# Patient Record
Sex: FEMALE | Race: WHITE | NOT HISPANIC OR LATINO | Employment: OTHER | URBAN - NONMETROPOLITAN AREA
[De-identification: names, ages, dates, MRNs, and addresses within clinical notes are randomized per-mention and may not be internally consistent; named-entity substitution may affect disease eponyms.]

---

## 2017-02-21 ENCOUNTER — OFFICE VISIT (OUTPATIENT)
Dept: OTOLARYNGOLOGY | Facility: CLINIC | Age: 72
End: 2017-02-21

## 2017-02-21 ENCOUNTER — PROCEDURE VISIT (OUTPATIENT)
Dept: OTOLARYNGOLOGY | Facility: CLINIC | Age: 72
End: 2017-02-21

## 2017-02-21 VITALS
BODY MASS INDEX: 28 KG/M2 | HEART RATE: 98 BPM | HEIGHT: 64 IN | WEIGHT: 164 LBS | SYSTOLIC BLOOD PRESSURE: 142 MMHG | DIASTOLIC BLOOD PRESSURE: 86 MMHG | RESPIRATION RATE: 20 BRPM | TEMPERATURE: 98 F

## 2017-02-21 DIAGNOSIS — H90.12 CONDUCTIVE HEARING LOSS IN LEFT EAR: ICD-10-CM

## 2017-02-21 DIAGNOSIS — H90.5 SENSORINEURAL HEARING LOSS OF RIGHT EAR: ICD-10-CM

## 2017-02-21 DIAGNOSIS — J30.9 ALLERGIC RHINITIS, UNSPECIFIED ALLERGIC RHINITIS TRIGGER, UNSPECIFIED RHINITIS SEASONALITY: ICD-10-CM

## 2017-02-21 DIAGNOSIS — H90.12 CONDUCTIVE HEARING LOSS OF LEFT EAR WITH UNRESTRICTED HEARING OF CONTRALATERAL EAR: ICD-10-CM

## 2017-02-21 DIAGNOSIS — H72.92 TYMPANIC MEMBRANE PERFORATION, LEFT: Primary | ICD-10-CM

## 2017-02-21 DIAGNOSIS — H61.21 IMPACTED CERUMEN OF RIGHT EAR: ICD-10-CM

## 2017-02-21 DIAGNOSIS — H69.83 ETD (EUSTACHIAN TUBE DYSFUNCTION), BILATERAL: ICD-10-CM

## 2017-02-21 PROCEDURE — G0268 REMOVAL OF IMPACTED WAX MD: HCPCS | Performed by: NURSE PRACTITIONER

## 2017-02-21 PROCEDURE — 99214 OFFICE O/P EST MOD 30 MIN: CPT | Performed by: NURSE PRACTITIONER

## 2017-02-21 NOTE — PROGRESS NOTES
Procedure   Procedures    PROCEDURE NOTE    LOCATION: [facility]  PROVIDER: DAYANNA Jacobs   PREOPERATIVE DIAGNOSIS: Cerumen Impaction  POSTOPERATIVE DIAGNOSIS: Same  PROCEDURE: Cerumen removal  ANESTHESIA: None   REASON FOR THE OPERATION: The patient verbally consented to intervention after a full discussion of risks, benefits, and alternatives. No guarantees were made or implied.   DETAILS OF THE OPERATION: The patient was reclined in the procedure room chair. The binocular microscope was used to visualize the ear canal and tympanic membrane. Cerumen was then removed from the both ears using a alligator forceps and suction. Findings: Severe cerumen impaction removed from the right EAC.  Right EAC clear without lesion.  Right TM intact without effusion.  Small amount of cerumen was removed from the left EAC revealing persistent, inferior central TM perforation.  Left EAC clear without lesion.  Left TM perforation without evidence of drainage or cholesteatoma.. Patient tolerated procedure well and was without complications

## 2017-02-21 NOTE — PATIENT INSTRUCTIONS
Dry ear precautions. Call for ear drainage, ear pain, fever over 101, or hearing loss. Call for problems or worsening symptoms.

## 2017-02-21 NOTE — PATIENT INSTRUCTIONS
(1) Pt to see the medical provider due to the perforated TM.  (2) Pt to receive audiological evaluations as needed.

## 2017-02-21 NOTE — PROGRESS NOTES
PRIMARY CARE PROVIDER: Baltazar Dockery Jr., MD  REFERRING PROVIDER: No ref. provider found    Chief Complaint   Patient presents with   • Follow-up     Ears       Subjective   History of Present Illness:  Kajal Huston is a  71 y.o.  female who is here for follow up of left ear problems. She has had problems with ear fullness, ear drainage, muffled hearing, TM perforation, and ear infections which began in June 2016. The symptoms were localized to the left ear. The symptoms have been resolved for the last several months.  Today, she has no current complaints.  She denies pain, fever and chills, otalgia, ear fullness, ear pressure, otorrhea, ear infections, fluid on the ear, vertigo, dizziness, imbalance, hearing loss, decreased hearing, change in hearing, muffled hearing and tinnitus, nasal congestion, drainage and sinusitis.     Review of Systems:  Review of Systems   Constitutional: Negative for chills, fatigue and fever.   HENT: Negative for congestion, ear discharge, ear pain, hearing loss, postnasal drip, sore throat, tinnitus and voice change.    All other systems reviewed and are negative.      Past History:  Past Medical History   Diagnosis Date   • Diabetes    • Thyroid disorder      Past Surgical History   Procedure Laterality Date   • Hysterectomy     • Tonsillectomy       History reviewed. No pertinent family history.     Social History   Substance Use Topics   • Smoking status: Never Smoker   • Smokeless tobacco: None   • Alcohol use No       Current Outpatient Prescriptions:   •  carvedilol (COREG) 12.5 MG tablet, , Disp: , Rfl:   •  glipiZIDE (GLUCOTROL) 10 MG 24 hr tablet, , Disp: , Rfl:   •  glipiZIDE (GLUCOTROL) 5 MG ER tablet, , Disp: , Rfl:   •  levothyroxine (SYNTHROID, LEVOTHROID) 50 MCG tablet, , Disp: , Rfl:   •  lisinopril (PRINIVIL,ZESTRIL) 20 MG tablet, , Disp: , Rfl:   •  lovastatin (MEVACOR) 20 MG tablet, , Disp: , Rfl:   •  metFORMIN XR (GLUCOPHAGE-XR) 500 MG 24 hr tablet, , Disp: ,  "Rfl:   •  NOVOLIN 70/30 RELION (70-30) 100 UNIT/ML injection, , Disp: , Rfl:   Allergies:  Review of patient's allergies indicates no known allergies.    Objective     Vital Signs:      Visit Vitals   • /86   • Pulse 98   • Temp 98 °F (36.7 °C)   • Resp 20   • Ht 64\" (162.6 cm)   • Wt 164 lb (74.4 kg)   • BMI 28.15 kg/m2         Physical Exam:  Physical Exam   Constitutional: She is oriented to person, place, and time. She appears well-developed and well-nourished. She is cooperative. No distress.   HENT:   Head: Normocephalic and atraumatic.   Right Ear: External ear and ear canal normal. No drainage or swelling. No middle ear effusion.   Left Ear: External ear and ear canal normal. No drainage or swelling. Tympanic membrane is perforated (no evidence of cholesteatoma).   Ears:    Nose: Mucosal edema present. No rhinorrhea or nasal deformity.   Mouth/Throat: Uvula is midline, oropharynx is clear and moist and mucous membranes are normal.   Bilateral cerumen removed under microscopy for exam.  Severe cerumen impaction removed from the right EAC with suction and forceps.  A small amount of cerumen was removed from the left EAC.   Eyes: Conjunctivae, EOM and lids are normal.   Neck: Phonation normal. Neck supple.   Pulmonary/Chest: Effort normal. No stridor. No respiratory distress.   Lymphadenopathy:     She has no cervical adenopathy.   Neurological: She is alert and oriented to person, place, and time. She has normal strength. No cranial nerve deficit.   Skin: Skin is warm and intact.   Psychiatric: She has a normal mood and affect. Her speech is normal and behavior is normal. Judgment and thought content normal.     Results Reviewed:      Assessment   Assessment:  1. Tympanic membrane perforation, left    2. ETD (eustachian tube dysfunction), bilateral    3. Conductive hearing loss in left ear    4. Sensorineural hearing loss of right ear    5. Allergic rhinitis, unspecified allergic rhinitis trigger, " unspecified rhinitis seasonality    6. Impacted cerumen of right ear        Plan   Plan:  Continue Flonase. Will continue to closely monitor left TM perforation. Right cerumen impaction removed- see procedure note.    Patient Instructions   Dry ear precautions. Call for ear drainage, ear pain, fever over 101, or hearing loss. Call for problems or worsening symptoms.     Return in about 6 months (around 8/21/2017), or if symptoms worsen or fail to improve, for Recheck.    My findings and recommendations were discussed and questions were answered.

## 2017-09-01 ENCOUNTER — OFFICE VISIT (OUTPATIENT)
Dept: OTOLARYNGOLOGY | Facility: CLINIC | Age: 72
End: 2017-09-01

## 2017-09-01 VITALS
BODY MASS INDEX: 28 KG/M2 | HEART RATE: 63 BPM | HEIGHT: 64 IN | WEIGHT: 164 LBS | TEMPERATURE: 98 F | DIASTOLIC BLOOD PRESSURE: 72 MMHG | SYSTOLIC BLOOD PRESSURE: 131 MMHG | RESPIRATION RATE: 20 BRPM

## 2017-09-01 DIAGNOSIS — J30.9 ALLERGIC RHINITIS, UNSPECIFIED ALLERGIC RHINITIS TRIGGER, UNSPECIFIED RHINITIS SEASONALITY: ICD-10-CM

## 2017-09-01 DIAGNOSIS — H72.92 TYMPANIC MEMBRANE PERFORATION, LEFT: Primary | ICD-10-CM

## 2017-09-01 DIAGNOSIS — H69.83 ETD (EUSTACHIAN TUBE DYSFUNCTION), BILATERAL: ICD-10-CM

## 2017-09-01 DIAGNOSIS — H90.12 CONDUCTIVE HEARING LOSS IN LEFT EAR: ICD-10-CM

## 2017-09-01 DIAGNOSIS — H90.5 SENSORINEURAL HEARING LOSS OF RIGHT EAR: ICD-10-CM

## 2017-09-01 PROCEDURE — 99213 OFFICE O/P EST LOW 20 MIN: CPT | Performed by: NURSE PRACTITIONER

## 2017-09-01 NOTE — PROGRESS NOTES
PRIMARY CARE PROVIDER: Baltazar Dockery Jr., MD  REFERRING PROVIDER: No ref. provider found    Chief Complaint   Patient presents with   • Follow-up     EARS       Subjective   History of Present Illness:  Kajal Huston is a  71 y.o.  female who is here for follow up of left ear problems. She has had problems with ear fullness, ear drainage, muffled hearing, TM perforation, and ear infections which began in June 2016. The symptoms were localized to the left ear. The symptoms have been resolved for the last 1 year.  Today, she has no current complaints.  She denies pain, fever and chills, otalgia, ear fullness, ear pressure, otorrhea, ear infections, fluid on the ear, vertigo, dizziness, imbalance, hearing loss, decreased hearing, change in hearing, muffled hearing and tinnitus, nasal congestion, drainage and sinusitis.     Review of Systems:  Review of Systems   Constitutional: Negative for chills, fatigue and fever.   HENT: Negative for congestion, ear discharge, ear pain, hearing loss, postnasal drip, sore throat, tinnitus and voice change.    All other systems reviewed and are negative.      Past History:  Past Medical History:   Diagnosis Date   • Diabetes    • Thyroid disorder      Past Surgical History:   Procedure Laterality Date   • HYSTERECTOMY     • TONSILLECTOMY       History reviewed. No pertinent family history.     Social History   Substance Use Topics   • Smoking status: Never Smoker   • Smokeless tobacco: None   • Alcohol use No       Current Outpatient Prescriptions:   •  carvedilol (COREG) 12.5 MG tablet, , Disp: , Rfl:   •  glipiZIDE (GLUCOTROL) 10 MG 24 hr tablet, , Disp: , Rfl:   •  glipiZIDE (GLUCOTROL) 5 MG ER tablet, , Disp: , Rfl:   •  levothyroxine (SYNTHROID, LEVOTHROID) 50 MCG tablet, , Disp: , Rfl:   •  lisinopril (PRINIVIL,ZESTRIL) 20 MG tablet, , Disp: , Rfl:   •  lovastatin (MEVACOR) 20 MG tablet, , Disp: , Rfl:   •  metFORMIN XR (GLUCOPHAGE-XR) 500 MG 24 hr tablet, , Disp: , Rfl:  "  •  NOVOLIN 70/30 RELION (70-30) 100 UNIT/ML injection, , Disp: , Rfl:   Allergies:  Review of patient's allergies indicates no known allergies.    Objective     Vital Signs:      /72  Pulse 63  Temp 98 °F (36.7 °C)  Resp 20  Ht 64\" (162.6 cm)  Wt 164 lb (74.4 kg)  BMI 28.15 kg/m2      Physical Exam:  Physical Exam   Constitutional: She is oriented to person, place, and time. She appears well-developed and well-nourished. She is cooperative. No distress.   HENT:   Head: Normocephalic and atraumatic.   Right Ear: External ear and ear canal normal. No drainage or swelling. No middle ear effusion.   Left Ear: External ear and ear canal normal. No drainage or swelling. Tympanic membrane is perforated.   Ears:    Nose: Mucosal edema present. No rhinorrhea or nasal deformity.   Mouth/Throat: Uvula is midline, oropharynx is clear and moist and mucous membranes are normal.   Thin ceruminous film covering left TM. Left TM perforation appears to be present but may be healing.  No evidence of cholesteatoma or drainage.   Eyes: Conjunctivae, EOM and lids are normal.   Neck: Phonation normal. Neck supple.   Pulmonary/Chest: Effort normal. No stridor. No respiratory distress.   Lymphadenopathy:     She has no cervical adenopathy.   Neurological: She is alert and oriented to person, place, and time. She has normal strength. No cranial nerve deficit.   Skin: Skin is warm and intact.   Psychiatric: She has a normal mood and affect. Her speech is normal and behavior is normal. Judgment and thought content normal.     Results Reviewed:      Assessment   Assessment:  1. Tympanic membrane perforation, left    2. ETD (eustachian tube dysfunction), bilateral    3. Conductive hearing loss in left ear    4. Sensorineural hearing loss of right ear    5. Allergic rhinitis, unspecified allergic rhinitis trigger, unspecified rhinitis seasonality        Plan   Plan:    Continue Flonase. Will continue to closely monitor left TM " perforation.  Follow-up in 6 months with audiogram. Dry ear precautions. Call for ear drainage, ear pain, fever over 101, or hearing loss. Call for problems or worsening symptoms.     Return in about 6 months (around 3/1/2018) for With Audio.    My findings and recommendations were discussed and questions were answered.

## 2017-09-06 PROBLEM — H72.90 TYMPANIC MEMBRANE PERFORATION: Status: ACTIVE | Noted: 2017-09-06

## 2017-09-06 PROBLEM — H69.90 ETD (EUSTACHIAN TUBE DYSFUNCTION): Status: ACTIVE | Noted: 2017-09-06

## 2017-09-06 PROBLEM — H69.80 ETD (EUSTACHIAN TUBE DYSFUNCTION): Status: ACTIVE | Noted: 2017-09-06

## 2017-09-06 PROBLEM — J30.9 ALLERGIC RHINITIS: Status: ACTIVE | Noted: 2017-09-06

## 2018-03-06 ENCOUNTER — OFFICE VISIT (OUTPATIENT)
Dept: OTOLARYNGOLOGY | Facility: CLINIC | Age: 73
End: 2018-03-06

## 2018-03-06 VITALS
BODY MASS INDEX: 27.66 KG/M2 | DIASTOLIC BLOOD PRESSURE: 78 MMHG | SYSTOLIC BLOOD PRESSURE: 132 MMHG | WEIGHT: 162 LBS | HEIGHT: 64 IN | TEMPERATURE: 98.4 F

## 2018-03-06 DIAGNOSIS — H90.12 CONDUCTIVE HEARING LOSS OF LEFT EAR, UNSPECIFIED HEARING STATUS ON CONTRALATERAL SIDE: ICD-10-CM

## 2018-03-06 DIAGNOSIS — H90.5 SENSORINEURAL HEARING LOSS (SNHL) OF RIGHT EAR, UNSPECIFIED HEARING STATUS ON CONTRALATERAL SIDE: ICD-10-CM

## 2018-03-06 DIAGNOSIS — H69.83 ETD (EUSTACHIAN TUBE DYSFUNCTION), BILATERAL: ICD-10-CM

## 2018-03-06 DIAGNOSIS — H72.92 TYMPANIC MEMBRANE PERFORATION, LEFT: Primary | ICD-10-CM

## 2018-03-06 DIAGNOSIS — J30.9 ALLERGIC RHINITIS, UNSPECIFIED CHRONICITY, UNSPECIFIED SEASONALITY, UNSPECIFIED TRIGGER: ICD-10-CM

## 2018-03-06 PROCEDURE — 99213 OFFICE O/P EST LOW 20 MIN: CPT | Performed by: NURSE PRACTITIONER

## 2018-03-06 RX ORDER — FUROSEMIDE 40 MG/1
40 TABLET ORAL DAILY PRN
COMMUNITY
Start: 2018-01-08

## 2018-03-06 RX ORDER — LOVASTATIN 40 MG/1
40 TABLET ORAL NIGHTLY
COMMUNITY
Start: 2018-01-08

## 2018-03-06 NOTE — PROGRESS NOTES
PRIMARY CARE PROVIDER: Baltazar Dockery Jr., MD  REFERRING PROVIDER: No ref. provider found    Chief Complaint   Patient presents with   • Follow-up     recheck ear       Subjective   History of Present Illness:  Kajal Huston is a  72 y.o.  female who is here for follow up of left ear problems. She has had problems with a TM perforation due to an ear infection in June 2016. The symptoms were localized to the left ear. The symptoms have been resolved for the last 1-2 years.  Today, she has no current complaints.  She denies pain, fever and chills, otalgia, ear fullness, ear pressure, otorrhea, ear infections, fluid on the ear, vertigo, dizziness, imbalance, hearing loss, decreased hearing, change in hearing, muffled hearing and tinnitus, nasal congestion, drainage and sinusitis.     Review of Systems:  Review of Systems   Constitutional: Negative for chills, fatigue and fever.   HENT: Negative for congestion, ear discharge, ear pain, hearing loss, postnasal drip, sore throat, tinnitus and voice change.    All other systems reviewed and are negative.      Past History:  Past Medical History:   Diagnosis Date   • CHF (congestive heart failure)    • Diabetes    • Hypercholesteremia    • Hypertension    • Thyroid disorder      Past Surgical History:   Procedure Laterality Date   • HYSTERECTOMY     • TONSILLECTOMY       Family History   Problem Relation Age of Onset   • Diabetes Mother         Social History   Substance Use Topics   • Smoking status: Former Smoker     Quit date: 3/6/2003   • Smokeless tobacco: Never Used   • Alcohol use No       Current Outpatient Prescriptions:   •  carvedilol (COREG) 12.5 MG tablet, , Disp: , Rfl:   •  furosemide (LASIX) 40 MG tablet, , Disp: , Rfl:   •  glipiZIDE (GLUCOTROL) 10 MG 24 hr tablet, , Disp: , Rfl:   •  glipiZIDE (GLUCOTROL) 5 MG ER tablet, , Disp: , Rfl:   •  levothyroxine (SYNTHROID, LEVOTHROID) 50 MCG tablet, , Disp: , Rfl:   •  lisinopril (PRINIVIL,ZESTRIL) 20 MG  "tablet, , Disp: , Rfl:   •  lovastatin (MEVACOR) 40 MG tablet, , Disp: , Rfl:   •  metFORMIN XR (GLUCOPHAGE-XR) 500 MG 24 hr tablet, , Disp: , Rfl:   •  NOVOLIN 70/30 RELION (70-30) 100 UNIT/ML injection, , Disp: , Rfl:   Allergies:  Review of patient's allergies indicates no known allergies.    Objective     Vital Signs:  Temp:  [98.4 °F (36.9 °C)] 98.4 °F (36.9 °C)  BP: (132)/(78) 132/78   /78  Temp 98.4 °F (36.9 °C)  Ht 162.6 cm (64\")  Wt 73.5 kg (162 lb)  BMI 27.81 kg/m2    Physical Exam:  Physical Exam   Constitutional: She is oriented to person, place, and time. She appears well-developed and well-nourished. She is cooperative. No distress.   HENT:   Head: Normocephalic and atraumatic.   Right Ear: External ear and ear canal normal. No drainage or swelling. No middle ear effusion.   Left Ear: External ear and ear canal normal. No drainage or swelling. Tympanic membrane is perforated.   Ears:    Nose: Mucosal edema present. No rhinorrhea or nasal deformity.   Mouth/Throat: Uvula is midline, oropharynx is clear and moist and mucous membranes are normal.   Thin ceruminous film covering left TM. Left TM perforation appears to be present but healing.  No evidence of cholesteatoma or drainage.   Eyes: Conjunctivae, EOM and lids are normal.   Neck: Phonation normal. Neck supple.   Pulmonary/Chest: Effort normal. No stridor. No respiratory distress.   Lymphadenopathy:     She has no cervical adenopathy.   Neurological: She is alert and oriented to person, place, and time. She has normal strength. No cranial nerve deficit.   Skin: Skin is warm and intact.   Psychiatric: She has a normal mood and affect. Her speech is normal and behavior is normal. Judgment and thought content normal.     Results Reviewed:      Assessment   Assessment:  1. Tympanic membrane perforation, left    2. ETD (Eustachian tube dysfunction), bilateral    3. Conductive hearing loss of left ear, unspecified hearing status on contralateral " side    4. Sensorineural hearing loss (SNHL) of right ear, unspecified hearing status on contralateral side    5. Allergic rhinitis, unspecified chronicity, unspecified seasonality, unspecified trigger    6. BMI 27.0-27.9,adult        Plan   Plan:    Continue Flonase. Will continue to closely monitor left TM perforation. She declined audiogram today because she states she has had no changes in hearing. Follow-up in 6 months. Dry ear precautions. Call for ear drainage, ear pain, fever over 101, or hearing loss. Call for problems or worsening symptoms.     QUALITY MEASURES    Body Mass Index Screening and Follow-Up Plan  Body mass index is 27.81 kg/(m^2).  Patient's BMI is above normal parameters. Follow-up plan includes:  educational material.    Tobacco Use: Screening and Cessation Intervention  Smoking status: Former Smoker                                                              Packs/day: 0.00      Years: 0.00         Quit date: 3/6/2003  Smokeless status: Never Used                          Return in about 6 months (around 9/6/2018) for Recheck.    My findings and recommendations were discussed and questions were answered.

## 2019-06-03 ENCOUNTER — TRANSCRIBE ORDERS (OUTPATIENT)
Dept: PHYSICAL THERAPY | Facility: HOSPITAL | Age: 74
End: 2019-06-03

## 2019-06-03 DIAGNOSIS — M54.5 CHRONIC LOW BACK PAIN, UNSPECIFIED BACK PAIN LATERALITY, WITH SCIATICA PRESENCE UNSPECIFIED: Primary | ICD-10-CM

## 2019-06-03 DIAGNOSIS — M51.36 DDD (DEGENERATIVE DISC DISEASE), LUMBAR: ICD-10-CM

## 2019-06-03 DIAGNOSIS — G89.29 CHRONIC LOW BACK PAIN, UNSPECIFIED BACK PAIN LATERALITY, WITH SCIATICA PRESENCE UNSPECIFIED: Primary | ICD-10-CM

## 2019-06-10 ENCOUNTER — HOSPITAL ENCOUNTER (OUTPATIENT)
Dept: PHYSICAL THERAPY | Facility: HOSPITAL | Age: 74
Setting detail: THERAPIES SERIES
Discharge: HOME | End: 2019-06-10

## 2019-06-10 DIAGNOSIS — M54.40 LOW BACK PAIN WITH SCIATICA, SCIATICA LATERALITY UNSPECIFIED, UNSPECIFIED BACK PAIN LATERALITY, UNSPECIFIED CHRONICITY: ICD-10-CM

## 2019-06-10 DIAGNOSIS — M51.36 DDD (DEGENERATIVE DISC DISEASE), LUMBAR: Primary | ICD-10-CM

## 2019-06-10 PROCEDURE — 97110 THERAPEUTIC EXERCISES: CPT | Performed by: PHYSICAL THERAPIST

## 2019-06-10 PROCEDURE — 97162 PT EVAL MOD COMPLEX 30 MIN: CPT | Performed by: PHYSICAL THERAPIST

## 2019-06-10 NOTE — THERAPY EVALUATION
Outpatient Physical Therapy Ortho Initial Evaluation  [facility]     Patient Name: Kajal Huston  : 1945  MRN: [id number]  Today's Date: 6/10/2019      Visit Date: 06/10/2019  Visit   Return to MD:ALIA  Re-cert date: 6/10/19  Patient Active Problem List   Diagnosis   • Tympanic membrane perforation   • ETD (eustachian tube dysfunction)   • Allergic rhinitis        Past Medical History:   Diagnosis Date   • CHF (congestive heart failure) (CMS/HCC)    • Diabetes (CMS/[facility])    • Hypercholesteremia    • Hypertension    • Thyroid disorder         Past Surgical History:   Procedure Laterality Date   • CORONARY ANGIOPLASTY     • HYSTERECTOMY     • TONSILLECTOMY         Visit Dx:     ICD-10-CM ICD-9-CM   1. DDD (degenerative disc disease), lumbar M51.36 722.52   2. Low back pain with sciatica, sciatica laterality unspecified, unspecified back pain laterality, unspecified chronicity M54.40 724.3       Medications (Admitted on 6/10/2019)     carvedilol (COREG) 12.5 MG tablet     furosemide (LASIX) 40 MG tablet     glipiZIDE (GLUCOTROL) 10 MG 24 hr tablet     glipiZIDE (GLUCOTROL) 5 MG ER tablet     levothyroxine (SYNTHROID, LEVOTHROID) 50 MCG tablet     lisinopril (PRINIVIL,ZESTRIL) 20 MG tablet     lovastatin (MEVACOR) 40 MG tablet     metFORMIN XR (GLUCOPHAGE-XR) 500 MG 24 hr tablet     NOVOLIN 70/30 RELION (70-30) 100 UNIT/ML injection      Allergies: NKA      PT Ortho     Row Name 06/10/19 1100       Subjective Comments    Subjective Comments  72 yo female with chronic LBP for many years. Has had trouble with increased LBP for prolonged standing and walking, especially to the mailbox or walking in the mall. Enjoys shopping. Has intermittent B LE numbness travelling to the calf region. Diagnosed with lumbar DDD.  -BS       Precautions and Contraindications    Precautions/Limitations  no known precautions/limitations  -BS       Subjective Pain    Able to rate subjective pain?  yes  -BS     Pre-Treatment Pain Level  3  -BS    Post-Treatment Pain Level  1  -BS       Quarter Clearing    Quarter Clearing  Lower Quarter Clearing  -BS       Neural Tension Signs- Lower Quarter Clearing    SLR  Bilateral:;Negative  -BS       Sensory Screen for Light Touch- Lower Quarter Clearing    L1 (inguinal area)  Bilateral:;Intact  -BS    L2 (anterior mid thigh)  Bilateral:;Intact  -BS    L3 (distal anterior thigh)  Bilateral:;Intact  -BS    L4 (medial lower leg/foot)  Bilateral:;Intact  -BS    L5 (lateral lower leg/great toe)  Bilateral:;Intact  -BS    S1 (bottom of foot)  Bilateral:;Intact  -BS       Myotomal Screen- Lower Quarter Clearing    Hip flexion (L2)  Bilateral:;5 (Normal)  -BS    Knee extension (L3)  Bilateral:;5 (Normal)  -BS    Ankle DF (L4)  Bilateral:;5 (Normal)  -BS    Great toe extension (L5)  Bilateral:;5 (Normal)  -BS    Ankle PF (S1)  Bilateral:;5 (Normal)  -BS    Knee flexion (S2)  Bilateral:;5 (Normal)  -BS       Lumbar ROM Screen- Lower Quarter Clearing    Lumbar Flexion  Impaired  -BS    Lumbar Extension  Normal  -BS    Lumbar Lateral Flexion  Normal  -BS    Lumbar Rotation  Normal  -BS       SI/Hip Screen- Lower Quarter Clearing    Mina's/Joel's test  Bilateral:;Negative  -BS       Flexibility    Flexibility Tested?  Lower Extremity  -BS       Lower Extremity Flexibility    Hamstrings  Left:;Mildly limited;Right:;WNL  -BS    Hip External Rotators  Bilateral:;Mildly limited  -BS      User Key  (r) = Recorded By, (t) = Taken By, (c) = Cosigned By    Initials Name Provider Type    BS Ronal Saldivar, PT Physical Therapist                      Therapy Education  Education Details: HEP: standing HS S, supine piriformis S, bridges   Given: HEP, Pain management  Program: New  How Provided: Verbal, Demonstration  Provided to: Patient  Level of Understanding: Verbalized, Demonstrated, Teach back education performed     PT OP Goals     Row Name 06/10/19 1110 06/10/19 1100       PT Short Term Goals     STG Date to Achieve  07/01/19  -BS  --    STG 1  Pt independent with HEP  -BS  --    STG 1 Progress  New  -BS  --    STG 2  Improve lumbar spine flex AROM to WFL (fingers to reach ankle level)-pain free  -BS  --    STG 2 Progress  New  -BS  --    STG 3  Improve core trunk stability to WFL  -BS  --    STG 3 Progress  New  -BS  --    STG 4  Improve Modified Oswestry score to 7 or less  -BS  --    STG 4 Progress  New  -BS  --       Time Calculation    PT Goal Re-Cert Due Date  07/01/19  -BS  --  -BS      User Key  (r) = Recorded By, (t) = Taken By, (c) = Cosigned By    Initials Name Provider Type    BS Ronal Saldivar, PT Physical Therapist          PT Assessment/Plan     Row Name 06/10/19 1110          PT Assessment    Functional Limitations  Impaired gait;Performance in leisure activities  -BS     Impairments  Impaired flexibility;Range of motion;Pain;Muscle strength  -BS     Assessment Comments  Chronic LBP due to lumbar DDD. No evidence of LE radiculopathy.  -BS     Please refer to paper survey for additional self-reported information  Yes  -BS     Rehab Potential  Good  -BS     Patient/caregiver participated in establishment of treatment plan and goals  Yes  -BS     Patient would benefit from skilled therapy intervention  Yes  -BS        PT Plan    PT Frequency  2x/week  -BS     Predicted Duration of Therapy Intervention (Therapy Eval)  3 weeks  -BS     Planned CPT's?  PT EVAL MOD COMPLELITY: 65242;PT RE-EVAL: 48892;PT THER PROC EA 15 MIN: 36115;PT MANUAL THERAPY EA 15 MIN: 56723;PT NEUROMUSC RE-EDUCATION EA 15 MIN: 29430;PT HOT OR COLD PACK TREAT MCARE;PT ELECTRICAL STIM UNATTEND: ;PT THER SUPP EA 15 MIN  -BS     Physical Therapy Interventions (Optional Details)  balance training;home exercise program;joint mobilization;lumbar stabilization;manual therapy techniques;modalities;patient/family education;strengthening;stretching  -BS     PT Plan Comments  f/u with core trunk stability, HS and piriformis  "stretching.  -BS       User Key  (r) = Recorded By, (t) = Taken By, (c) = Cosigned By    Initials Name Provider Type    BS Ronal Saldivar, PT Physical Therapist            Exercises     Row Name 06/10/19 1100             Subjective Comments    Subjective Comments  74 yo female with chronic LBP for many years. Has had trouble with increased LBP for prolonged standing and walking, especially to the mailbox or walking in the mall. Enjoys shopping. Has intermittent B LE numbness travelling to the calf region. Diagnosed with lumbar DDD.  -BS         Subjective Pain    Able to rate subjective pain?  yes  -BS      Pre-Treatment Pain Level  3  -BS      Post-Treatment Pain Level  1  -BS         Exercise 1    Exercise Name 1  bridges  -BS      Sets 1  1  -BS      Reps 1  10  -BS         Exercise 2    Exercise Name 2  supine piriformis S  -BS      Sets 2  1  -BS      Reps 2  2  -BS      Time 2  30\" hold  -BS         Exercise 3    Exercise Name 3  LTR  -BS      Sets 3  1  -BS      Reps 3  10  -BS      Time 3  10\" hold  -BS         Exercise 4    Exercise Name 4  standing HS S at steps  -BS      Sets 4  1  -BS      Reps 4  2  -BS      Time 4  30\" hold  -BS        User Key  (r) = Recorded By, (t) = Taken By, (c) = Cosigned By    Initials Name Provider Type    Ronal Gee, PT Physical Therapist                        Outcome Measure Options: Modifed Owestry  Modified Oswestry  Modified Oswestry Score/Comments: 12/50-24%      Time Calculation:     Start Time: 1110  Stop Time: 1154  Time Calculation (min): 44 min  Total Timed Code Minutes- PT: 44 minute(s)     Therapy Charges for Today     Code Description Service Date Service Provider Modifiers Qty    90329726191  PT EVAL MOD COMPLEXITY 2 6/10/2019 Ronal Saldivar, PT GP 1    [id number]  PT THER PROC EA 15 MIN 6/10/2019 Ronal Saldivar, PT GP 1          PT G-Codes  Outcome Measure Options: Modifed Owestry  Modified Oswestry Score/Comments: 12/50-24%         Ronal Saldivar, " PT  6/10/2019

## 2019-06-14 ENCOUNTER — HOSPITAL ENCOUNTER (OUTPATIENT)
Dept: PHYSICAL THERAPY | Facility: HOSPITAL | Age: 74
Setting detail: THERAPIES SERIES
Discharge: HOME | End: 2019-06-14

## 2019-06-14 DIAGNOSIS — M51.36 DDD (DEGENERATIVE DISC DISEASE), LUMBAR: Primary | ICD-10-CM

## 2019-06-14 DIAGNOSIS — M54.40 LOW BACK PAIN WITH SCIATICA, SCIATICA LATERALITY UNSPECIFIED, UNSPECIFIED BACK PAIN LATERALITY, UNSPECIFIED CHRONICITY: ICD-10-CM

## 2019-06-14 PROCEDURE — 97110 THERAPEUTIC EXERCISES: CPT

## 2019-06-14 NOTE — THERAPY TREATMENT NOTE
Outpatient Physical Therapy Ortho Treatment Note  [facility]     Patient Name: Kajal Huston  : 1945  MRN: [id number]  Today's Date: 2019      Visit Date: 2019   Pt's pre tx pain 3/10 post tx pain 0/10.    Pt reports ?% improvement.    2/2 visits    Next recert 19    Visit Dx:    ICD-10-CM ICD-9-CM   1. DDD (degenerative disc disease), lumbar M51.36 722.52   2. Low back pain with sciatica, sciatica laterality unspecified, unspecified back pain laterality, unspecified chronicity M54.40 724.3       Patient Active Problem List   Diagnosis   • Tympanic membrane perforation   • ETD (eustachian tube dysfunction)   • Allergic rhinitis        Past Medical History:   Diagnosis Date   • CHF (congestive heart failure) (CMS/[facility])    • Diabetes (CMS/[facility])    • Hypercholesteremia    • Hypertension    • Thyroid disorder         Past Surgical History:   Procedure Laterality Date   • CORONARY ANGIOPLASTY     • HYSTERECTOMY     • TONSILLECTOMY         PT Ortho     Row Name 19 1000       Precautions and Contraindications    Precautions/Limitations  no known precautions/limitations  -       Subjective Pain    Able to rate subjective pain?  yes  -    Pre-Treatment Pain Level  3  -      User Key  (r) = Recorded By, (t) = Taken By, (c) = Cosigned By    Initials Name Provider Type    Mireille Campoverde, CHUY Physical Therapy Assistant                      PT Assessment/Plan     Row Name 19 1000          PT Assessment    Assessment Comments  Pt tolerated tx session well.  Pt demo'd decreased pain after ther ex and manual.    -        PT Plan    PT Frequency  2x/week  -     PT Plan Comments  f/u HEP and cont to progress w/ core stability  -       User Key  (r) = Recorded By, (t) = Taken By, (c) = Cosigned By    Initials Name Provider Type    Mireille Campoverde, CHUY Physical Therapy Assistant            Exercises     Row Name 19 1000             Subjective Comments     "Subjective Comments  Pt c/o R hip pain this date.  Pt reports she has done some of her HEP, but also has been to the pool twice this week and done chair yoga x 1 this week.    -         Subjective Pain    Able to rate subjective pain?  yes  -AH      Pre-Treatment Pain Level  3  -AH      Post-Treatment Pain Level  0  -AH         Exercise 1    Exercise Name 1  ISO Trans ab   -AH      Sets 1  1  -AH      Reps 1  10  -AH      Time 1  5\" hold   -AH         Exercise 2    Exercise Name 2  BKLL   -AH      Reps 2  10  -AH         Exercise 3    Exercise Name 3  LTR   -AH      Reps 3  10  -AH      Time 3  10\"  -AH         Exercise 4    Exercise Name 4  Bridges   -AH      Reps 4  10  -AH      Time 4  3-5 sec  -AH         Exercise 5    Exercise Name 5  supine piriformis S B   -AH      Reps 5  2  -AH      Time 5  30\"  -AH         Exercise 6    Exercise Name 6  St. HS S B  -AH      Sets 6  2  -AH      Time 6  30\"  -AH         Exercise 7    Exercise Name 7  manual   -        User Key  (r) = Recorded By, (t) = Taken By, (c) = Cosigned By    Initials Name Provider Type     Mireille Aguilar, CHUY Physical Therapy Assistant                      Manual Rx (last 36 hours)      Manual Treatments     Row Name 06/14/19 0900             Manual Rx 1    Manual Rx 1 Location  R le short; L PSIS elevated; MET to R HS and L IR  -        User Key  (r) = Recorded By, (t) = Taken By, (c) = Cosigned By    Initials Name Provider Type     Mireille Aguilar, CHUY Physical Therapy Assistant          PT OP Goals     Row Name 06/14/19 1000          PT Short Term Goals    STG Date to Achieve  07/01/19  -     STG 1  Pt independent with HEP  -     STG 1 Progress  Not Met  -     STG 2  Improve lumbar spine flex AROM to WFL (fingers to reach ankle level)-pain free  -     STG 2 Progress  Not Met  -     STG 3  Improve core trunk stability to WFL  -     STG 3 Progress  Not Met  -     STG 4  Improve Modified Oswestry score to 7 or less  -     STG 4 " Progress  Not Met  -        Time Calculation    PT Goal Re-Cert Due Date  07/01/19  -       User Key  (r) = Recorded By, (t) = Taken By, (c) = Cosigned By    Initials Name Provider Type     Mireille Aguilar PTA Physical Therapy Assistant             Added clifford MENDEZ and CHAZ to HEP             Time Calculation:   Start Time: 1016  Stop Time: 1058  Time Calculation (min): 42 min  Therapy Charges for Today     Code Description Service Date Service Provider Modifiers Qty    55032247593 HC PT THER PROC EA 15 MIN 6/14/2019 Mireille Aguilar PTA GP 3                    Mireille Aguilar PTA  6/14/2019

## 2019-06-17 ENCOUNTER — HOSPITAL ENCOUNTER (OUTPATIENT)
Dept: PHYSICAL THERAPY | Facility: HOSPITAL | Age: 74
Setting detail: THERAPIES SERIES
Discharge: HOME | End: 2019-06-17

## 2019-06-17 DIAGNOSIS — M54.40 LOW BACK PAIN WITH SCIATICA, SCIATICA LATERALITY UNSPECIFIED, UNSPECIFIED BACK PAIN LATERALITY, UNSPECIFIED CHRONICITY: ICD-10-CM

## 2019-06-17 DIAGNOSIS — M51.36 DDD (DEGENERATIVE DISC DISEASE), LUMBAR: Primary | ICD-10-CM

## 2019-06-17 PROCEDURE — 97110 THERAPEUTIC EXERCISES: CPT | Performed by: PHYSICAL THERAPIST

## 2019-06-17 PROCEDURE — 97140 MANUAL THERAPY 1/> REGIONS: CPT | Performed by: PHYSICAL THERAPIST

## 2019-06-17 NOTE — THERAPY TREATMENT NOTE
"    Outpatient Physical Therapy Ortho Treatment Note  [facility]     Patient Name: Kajal Huston  : 1945  MRN: [id number]  Today's Date: 2019      Visit Date: 2019     Subjective Improvement:  \"getting better I think\"     Attendance: 3/3  Approved:  Medicare guidelines         MD follow up:    TBD       RC date:     19      Visit Dx:    ICD-10-CM ICD-9-CM   1. DDD (degenerative disc disease), lumbar M51.36 722.52   2. Low back pain with sciatica, sciatica laterality unspecified, unspecified back pain laterality, unspecified chronicity M54.40 724.3       Patient Active Problem List   Diagnosis   • Tympanic membrane perforation   • ETD (eustachian tube dysfunction)   • Allergic rhinitis        Past Medical History:   Diagnosis Date   • CHF (congestive heart failure) (CMS/[facility])    • Diabetes (CMS/[facility])    • Hypercholesteremia    • Hypertension    • Thyroid disorder         Past Surgical History:   Procedure Laterality Date   • CORONARY ANGIOPLASTY     • HYSTERECTOMY     • TONSILLECTOMY         PT Ortho     Row Name 19 1000       Precautions and Contraindications    Precautions/Limitations  no known precautions/limitations  -KG       Posture/Observations    Posture/Observations Comments  RLE short, R ASIS superior  -KG      User Key  (r) = Recorded By, (t) = Taken By, (c) = Cosigned By    Initials Name Provider Type    Paulina Lawton, PT Physical Therapist                      PT Assessment/Plan     Row Name 19 1000          PT Assessment    Assessment Comments  Pt with no pain at start of treatment and able to tolerate all therex activities without any increased pain. Assymetry noted at pelvis this date which mostly corrected with MET. Continues to be challenged with iso TrA contraction but does better with tactile cues and kegal addition.  -KG     Rehab Potential  Good  -KG     Patient/caregiver participated in establishment of treatment plan and goals  Yes  -KG " "    Patient would benefit from skilled therapy intervention  Yes  -KG        PT Plan    PT Frequency  2x/week  -KG     Predicted Duration of Therapy Intervention (Therapy Eval)  3 weeks  -KG     Planned CPT's?  PT AQUATIC THERAPY EA 15 MIN: 04530  -KG     PT Plan Comments  Will have 1 aquatic treatment session to teach HEP for water exercises. Possible seated core next land visit.  -KG       User Key  (r) = Recorded By, (t) = Taken By, (c) = Cosigned By    Initials Name Provider Type    KG Paulina Haider, PT Physical Therapist            Exercises     Row Name 06/17/19 1000             Precautions    Existing Precautions/Restrictions  no known precautions/restrictions  -KG         Subjective Comments    Subjective Comments  Pt states she's not having any hip pain today, no pain at all. Feels like therapy is helping. States her last treatment the PTA was supposed to show her some pool exercises she could do independently.  -KG         Subjective Pain    Able to rate subjective pain?  yes  -KG      Pre-Treatment Pain Level  0  -KG      Post-Treatment Pain Level  0  -KG         Aquatics    Aquatics performed?  No  -KG         Exercise 1    Exercise Name 1  Seated hamstring stretch  -KG      Cueing 1  Verbal  -KG      Reps 1  2  -KG      Time 1  30\" hold  -KG         Exercise 2    Exercise Name 2  Seated piriformis stretch  -KG      Cueing 2  Verbal  -KG      Reps 2  2  -KG      Time 2  30\" hold  -KG         Exercise 3    Exercise Name 3  Iso TrA review  -KG      Cueing 3  Verbal;Tactile  -KG      Time 3  4 min  -KG         Exercise 4    Exercise Name 4  HL iso TrA + Hip add squeeze  -KG      Cueing 4  Verbal;Tactile  -KG      Sets 4  2  -KG      Reps 4  10  -KG      Time 4  5\" hold  -KG         Exercise 5    Exercise Name 5  HL iso TrA + hip abd  -KG      Cueing 5  Verbal  -KG      Sets 5  1  -KG      Reps 5  15  -KG      Additional Comments  Yellow tband  -KG         Exercise 6    Exercise Name 6  Bridges  -KG      " "Cueing 6  Verbal;Tactile  -KG      Sets 6  2  -KG      Reps 6  10  -KG      Time 6  3\" hold  -KG         Exercise 7    Exercise Name 7  BKLL + iso TrA  -KG      Cueing 7  Verbal  -KG      Sets 7  1  -KG      Reps 7  10  -KG         Exercise 8    Exercise Name 8  SL Clamshells  -KG      Cueing 8  Verbal  -KG      Sets 8  1  -KG      Reps 8  15  -KG      Additional Comments  Bilateral  -KG        User Key  (r) = Recorded By, (t) = Taken By, (c) = Cosigned By    Initials Name Provider Type    Paulina Lawton, PT Physical Therapist                      Manual Rx (last 36 hours)      Manual Treatments     Row Name 06/17/19 1000             Manual Rx 1    Manual Rx 1 Location  R hip flexor, L hamstring MET  -KG      Manual Rx 1 Grade  3 min  -KG         Manual Rx 2    Manual Rx 2 Location  STM/MFR to Bilateral posterior hip; prone; L >R  -KG      Manual Rx 2 Duration  5 min  -KG        User Key  (r) = Recorded By, (t) = Taken By, (c) = Cosigned By    Initials Name Provider Type    Paulina Lawton, PT Physical Therapist          PT OP Goals     Row Name 06/17/19 1000          PT Short Term Goals    STG Date to Achieve  07/01/19  -KG     STG 1  Pt independent with HEP  -KG     STG 1 Progress  Progressing  -KG     STG 2  Improve lumbar spine flex AROM to WFL (fingers to reach ankle level)-pain free  -KG     STG 2 Progress  Progressing  -KG     STG 3  Improve core trunk stability to WFL  -KG     STG 3 Progress  Progressing  -KG     STG 4  Improve Modified Oswestry score to 7 or less  -KG     STG 4 Progress  Progressing  -KG        Time Calculation    PT Goal Re-Cert Due Date  07/01/19  -KG       User Key  (r) = Recorded By, (t) = Taken By, (c) = Cosigned By    Initials Name Provider Type    Paulina Lawton, PT Physical Therapist          Therapy Education  Education Details: Reviewed current HEP  Given: HEP, Symptoms/condition management, Pain management, Posture/body mechanics  Program: Reinforced  How " Provided: Verbal  Provided to: Patient  Level of Understanding: Teach back education performed, Verbalized, Demonstrated              Time Calculation:   Start Time: 1020  Stop Time: 1100  Time Calculation (min): 40 min  Total Timed Code Minutes- PT: 40 minute(s)  Therapy Charges for Today     Code Description Service Date Service Provider Modifiers Qty    22651376980 HC PT THER PROC EA 15 MIN 6/17/2019 Paulina Haider, PT GP 2    26105161771 HC PT MANUAL THERAPY EA 15 MIN 6/17/2019 Paulina Haider, PT GP 1                    Paulina Haider, PT  6/17/2019

## 2019-06-21 ENCOUNTER — APPOINTMENT (OUTPATIENT)
Dept: PHYSICAL THERAPY | Facility: HOSPITAL | Age: 74
End: 2019-06-21

## 2019-06-25 ENCOUNTER — HOSPITAL ENCOUNTER (OUTPATIENT)
Dept: PHYSICAL THERAPY | Facility: HOSPITAL | Age: 74
Setting detail: THERAPIES SERIES
Discharge: HOME | End: 2019-06-25

## 2019-06-25 DIAGNOSIS — M54.40 LOW BACK PAIN WITH SCIATICA, SCIATICA LATERALITY UNSPECIFIED, UNSPECIFIED BACK PAIN LATERALITY, UNSPECIFIED CHRONICITY: ICD-10-CM

## 2019-06-25 DIAGNOSIS — M51.36 DDD (DEGENERATIVE DISC DISEASE), LUMBAR: Primary | ICD-10-CM

## 2019-06-25 PROCEDURE — 97110 THERAPEUTIC EXERCISES: CPT

## 2019-06-25 NOTE — THERAPY TREATMENT NOTE
Outpatient Physical Therapy Ortho Treatment Note  [facility]     Patient Name: Kajal Huston  : 1945  MRN: [id number]  Today's Date: 2019      Visit Date: 2019  Subjective Improvement:     Attendance:   Approved:  Medicare guidelines         MD follow up:    TBD       RC date:     19      Visit Dx:    ICD-10-CM ICD-9-CM   1. DDD (degenerative disc disease), lumbar M51.36 722.52   2. Low back pain with sciatica, sciatica laterality unspecified, unspecified back pain laterality, unspecified chronicity M54.40 724.3       Patient Active Problem List   Diagnosis   • Tympanic membrane perforation   • ETD (eustachian tube dysfunction)   • Allergic rhinitis        Past Medical History:   Diagnosis Date   • CHF (congestive heart failure) (CMS/[facility])    • Diabetes (CMS/[facility])    • Hypercholesteremia    • Hypertension    • Thyroid disorder         Past Surgical History:   Procedure Laterality Date   • CORONARY ANGIOPLASTY     • HYSTERECTOMY     • TONSILLECTOMY                         PT Assessment/Plan     Row Name 19 1200          PT Assessment    Assessment Comments  Good arabella of aquatic therapy. Instructed pt in differant ex variations than she was used to doing. She reported less fatigue with pool exercising. Decreased hip pain afterward.   -        PT Plan    PT Frequency  2x/week  -NIDHI     Predicted Duration of Therapy Intervention (Therapy Eval)  2 weeks  -     PT Plan Comments  Cont PT per POC  -       User Key  (r) = Recorded By, (t) = Taken By, (c) = Cosigned By    Initials Name Provider Type    Lemuel Ruiz PTA Physical Therapy Assistant            Exercises     Row Name 19 1000             Precautions    Existing Precautions/Restrictions  no known precautions/restrictions  -NIDHI         Subjective Comments    Subjective Comments  Pt c/o mild hip pain   -NIDHI         Subjective Pain    Able to rate subjective pain?  yes  -NIDHI      Pre-Treatment Pain  Level  3  -JW      Post-Treatment Pain Level  0  -JW         Aquatics    Aquatics performed?  Yes  -JW         Exercise 1    Exercise Name 1  AQ: fwd amb  -JW      Time 1  3.5'  -JW         Exercise 2    Exercise Name 2  AQ: sidestep  -JW      Time 2  4'  -JW         Exercise 3    Exercise Name 3  AQ: rev amb  -JW      Time 3  3'  -JW         Exercise 4    Exercise Name 4  AQ: trunk twist  -JW      Sets 4  2  -JW      Reps 4  10  -JW      Additional Comments  pipe  -JW         Exercise 5    Exercise Name 5  AQ: float step  -JW      Reps 5  20  -JW      Additional Comments  blue float  -JW         Exercise 6    Exercise Name 6  AQ: hip circles  -JW      Sets 6  2  -JW      Reps 6  10  -JW      Additional Comments  small float  -JW         Exercise 7    Exercise Name 7  AQ: push/pull w/ cookie  -JW      Reps 7  20  -JW         Exercise 8    Exercise Name 8  AQ: shld 3 way  -JW      Reps 8  15x ea  -JW      Additional Comments  yellow hand wts  -JW         Exercise 9    Exercise Name 9  AQ: MS  -JW      Sets 9  2  -JW      Reps 9  10  -JW         Exercise 10    Exercise Name 10  AQ: HK march  -JW      Time 10  2'  -JW        User Key  (r) = Recorded By, (t) = Taken By, (c) = Cosigned By    Initials Name Provider Type    Lemuel Ruiz PTA Physical Therapy Assistant                       PT OP Goals     Row Name 06/25/19 1200          PT Short Term Goals    STG Date to Achieve  07/01/19  -JW     STG 1  Pt independent with HEP  -JW     STG 1 Progress  Progressing  -JW     STG 2  Improve lumbar spine flex AROM to WFL (fingers to reach ankle level)-pain free  -JW     STG 2 Progress  Progressing  -JW     STG 3  Improve core trunk stability to WFL  -JW     STG 3 Progress  Progressing  -JW     STG 4  Improve Modified Oswestry score to 7 or less  -JW     STG 4 Progress  Progressing  -JW       User Key  (r) = Recorded By, (t) = Taken By, (c) = Cosigned By    Initials Name Provider Type    Lemuel Ruiz PTA Physical  Therapy Assistant                         Time Calculation:   Start Time: 0930  Stop Time: 1015  Time Calculation (min): 45 min  Total Timed Code Minutes- PT: 45 minute(s)  Therapy Charges for Today     Code Description Service Date Service Provider Modifiers Qty    90599905306 HC PT THER PROC EA 15 MIN 6/25/2019 Lemuel Rodriguez, PTA GP 3                    Lemuel Rodriguez, PTA  6/25/2019

## 2019-06-27 ENCOUNTER — HOSPITAL ENCOUNTER (OUTPATIENT)
Dept: PHYSICAL THERAPY | Facility: HOSPITAL | Age: 74
Setting detail: THERAPIES SERIES
Discharge: HOME | End: 2019-06-27

## 2019-06-27 DIAGNOSIS — M51.36 DDD (DEGENERATIVE DISC DISEASE), LUMBAR: Primary | ICD-10-CM

## 2019-06-27 DIAGNOSIS — M54.40 LOW BACK PAIN WITH SCIATICA, SCIATICA LATERALITY UNSPECIFIED, UNSPECIFIED BACK PAIN LATERALITY, UNSPECIFIED CHRONICITY: ICD-10-CM

## 2019-06-27 PROCEDURE — 97110 THERAPEUTIC EXERCISES: CPT | Performed by: PHYSICAL THERAPY ASSISTANT

## 2019-06-27 NOTE — THERAPY DISCHARGE NOTE
"     Outpatient Physical Therapy Ortho Treatment Note/Discharge Summary  [facility]     Patient Name: Kajal Huston  : 1945  MRN: [id number]  Today's Date: 2019      Visit Date: 2019  Subjective Improvement:     Attendance:   Approved:  Medicare guidelines         MD follow up:    TBMARC        date:     19      Visit Dx:    ICD-10-CM ICD-9-CM   1. DDD (degenerative disc disease), lumbar M51.36 722.52   2. Low back pain with sciatica, sciatica laterality unspecified, unspecified back pain laterality, unspecified chronicity M54.40 724.3       Patient Active Problem List   Diagnosis   • Tympanic membrane perforation   • ETD (eustachian tube dysfunction)   • Allergic rhinitis        Past Medical History:   Diagnosis Date   • CHF (congestive heart failure) (CMS/[facility])    • Diabetes (CMS/[facility])    • Hypercholesteremia    • Hypertension    • Thyroid disorder         Past Surgical History:   Procedure Laterality Date   • CORONARY ANGIOPLASTY     • HYSTERECTOMY     • TONSILLECTOMY                         PT Assessment/Plan     Row Name 19 1000          PT Assessment    Assessment Comments  Good tolerance with all ther ex this date, Patient requesting to be DC's to [facility]        PT Plan    PT Plan Comments  DC per PT request  -       User Key  (r) = Recorded By, (t) = Taken By, (c) = Cosigned By    Initials Name Provider Type    Chris Bush, PTA Physical Therapy Assistant              Exercises     Row Name 19 1000             Subjective Comments    Subjective Comments  Patient has been doing a lot of [employer] work and is just tired  -         Subjective Pain    Able to rate subjective pain?  yes  -      Pre-Treatment Pain Level  3  -         Exercise 1    Exercise Name 1  ST hamstring stretch  -JH      Reps 1  2  -      Time 1  30\"  -         Exercise 2    Exercise Name 2  Seated piriformis stretch  -      Cueing 2  Verbal  -      Reps 2  2  -JH      " "Time 2  30\" hold  -JH         Exercise 3    Exercise Name 3  seated hip add  -JH      Sets 3  2  -JH      Reps 3  10  -JH      Time 3  5\" hold  -JH         Exercise 4    Exercise Name 4  bridges  -JH      Sets 4  2  -JH      Reps 4  10  -JH      Time 4  5\" hold  -JH         Exercise 5    Exercise Name 5  HL iso TrA + hip abd  -JH      Cueing 5  Verbal  -JH      Sets 5  2  -JH      Reps 5  10  -JH      Additional Comments  red tb  -JH         Exercise 6    Exercise Name 6  Sahrmanns L 2  -JH      Cueing 6  Verbal  -JH      Sets 6  2  -JH      Reps 6  10  -JH         Exercise 7    Exercise Name 7  supine SLR  -JH      Cueing 7  Verbal  -JH      Sets 7  2  -JH      Reps 7  10  -JH         Exercise 8    Exercise Name 8  check alignment  -JH      Time 8  3'  -JH         Exercise 9    Exercise Name 9  johana disc LAQ TrA  -JH      Cueing 9  Verbal  -JH      Sets 9  2  -JH      Reps 9  10  -JH         Exercise 10    Exercise Name 10  johana disc marches TrA  -JH      Cueing 10  Verbal  -JH      Sets 10  2  -JH      Reps 10  10  -JH         Exercise 11    Exercise Name 11  sit to stands  -JH      Sets 11  1  -JH      Reps 11  10  -JH        User Key  (r) = Recorded By, (t) = Taken By, (c) = Cosigned By    Initials Name Provider Type     Chris Beckford PTA Physical Therapy Assistant                                            Time Calculation:   Start Time: 1015  Stop Time: 1058  Time Calculation (min): 43 min  Therapy Charges for Today     Code Description Service Date Service Provider Modifiers Qty    77969488541 HC PT THER PROC EA 15 MIN 6/27/2019 Chris Beckford PTA GP 3                       Chris Beckford PTA  6/27/2019       "

## 2020-06-09 DIAGNOSIS — R07.2 PRECORDIAL CHEST PAIN: Primary | ICD-10-CM

## 2020-06-11 ENCOUNTER — TRANSCRIBE ORDERS (OUTPATIENT)
Dept: ADMINISTRATIVE | Facility: HOSPITAL | Age: 75
End: 2020-06-11

## 2020-06-11 DIAGNOSIS — Z01.818 PREOP TESTING: Primary | ICD-10-CM

## 2020-06-11 PROBLEM — R07.2 PRECORDIAL CHEST PAIN: Status: ACTIVE | Noted: 2020-06-11

## 2020-06-15 ENCOUNTER — TRANSCRIBE ORDERS (OUTPATIENT)
Dept: ADMINISTRATIVE | Facility: HOSPITAL | Age: 75
End: 2020-06-15

## 2020-06-15 DIAGNOSIS — Z01.818 PRE-OP TESTING: Primary | ICD-10-CM

## 2020-06-22 ENCOUNTER — HOSPITAL ENCOUNTER (OUTPATIENT)
Facility: HOSPITAL | Age: 75
Discharge: HOME | End: 2020-06-22
Attending: INTERNAL MEDICINE | Admitting: INTERNAL MEDICINE

## 2020-06-22 VITALS
DIASTOLIC BLOOD PRESSURE: 83 MMHG | HEART RATE: 54 BPM | WEIGHT: 154.13 LBS | HEIGHT: 63 IN | SYSTOLIC BLOOD PRESSURE: 154 MMHG | OXYGEN SATURATION: 97 % | RESPIRATION RATE: 21 BRPM | TEMPERATURE: 98.5 F | BODY MASS INDEX: 27.31 KG/M2

## 2020-06-22 DIAGNOSIS — R07.2 PRECORDIAL CHEST PAIN: ICD-10-CM

## 2020-06-22 LAB
ALBUMIN SERPL-MCNC: 3.9 G/DL (ref 3.5–5.2)
ALBUMIN/GLOB SERPL: 1.3 G/DL
ALP SERPL-CCNC: 60 U/L (ref 39–117)
ALT SERPL W P-5'-P-CCNC: 14 U/L (ref 1–33)
ANION GAP SERPL CALCULATED.3IONS-SCNC: 12 MMOL/L (ref 5–15)
AST SERPL-CCNC: 24 U/L (ref 1–32)
BASOPHILS # BLD AUTO: 0.1 10*3/MM3 (ref 0–0.2)
BASOPHILS NFR BLD AUTO: 1.2 % (ref 0–1.5)
BILIRUB SERPL-MCNC: 0.6 MG/DL (ref 0.2–1.2)
BUN BLD-MCNC: 12 MG/DL (ref 8–23)
BUN/CREAT SERPL: 17.6 (ref 7–25)
CALCIUM SPEC-SCNC: 9.2 MG/DL (ref 8.6–10.5)
CHLORIDE SERPL-SCNC: 102 MMOL/L (ref 98–107)
CO2 SERPL-SCNC: 23 MMOL/L (ref 22–29)
CREAT BLD-MCNC: 0.68 MG/DL (ref 0.57–1)
DEPRECATED RDW RBC AUTO: 41.7 FL (ref 37–54)
EOSINOPHIL # BLD AUTO: 0.17 10*3/MM3 (ref 0–0.4)
EOSINOPHIL NFR BLD AUTO: 2 % (ref 0.3–6.2)
ERYTHROCYTE [DISTWIDTH] IN BLOOD BY AUTOMATED COUNT: 13.5 % (ref 12.3–15.4)
GFR SERPL CREATININE-BSD FRML MDRD: 85 ML/MIN/1.73
GLOBULIN UR ELPH-MCNC: 3 GM/DL
GLUCOSE BLD-MCNC: 245 MG/DL (ref 65–99)
HCT VFR BLD AUTO: 44.2 % (ref 34–46.6)
HGB BLD-MCNC: 14.5 G/DL (ref 12–15.9)
IMM GRANULOCYTES # BLD AUTO: 0.03 10*3/MM3 (ref 0–0.05)
IMM GRANULOCYTES NFR BLD AUTO: 0.4 % (ref 0–0.5)
INR PPP: 1.02 (ref 0.91–1.09)
LYMPHOCYTES # BLD AUTO: 1.74 10*3/MM3 (ref 0.7–3.1)
LYMPHOCYTES NFR BLD AUTO: 20.9 % (ref 19.6–45.3)
MCH RBC QN AUTO: 27.9 PG (ref 26.6–33)
MCHC RBC AUTO-ENTMCNC: 32.8 G/DL (ref 31.5–35.7)
MCV RBC AUTO: 85.2 FL (ref 79–97)
MONOCYTES # BLD AUTO: 0.84 10*3/MM3 (ref 0.1–0.9)
MONOCYTES NFR BLD AUTO: 10.1 % (ref 5–12)
NEUTROPHILS # BLD AUTO: 5.43 10*3/MM3 (ref 1.7–7)
NEUTROPHILS NFR BLD AUTO: 65.4 % (ref 42.7–76)
NRBC BLD AUTO-RTO: 0 /100 WBC (ref 0–0.2)
PLATELET # BLD AUTO: 352 10*3/MM3 (ref 140–450)
PMV BLD AUTO: 11.3 FL (ref 6–12)
POTASSIUM BLD-SCNC: 4.3 MMOL/L (ref 3.5–5.2)
PROT SERPL-MCNC: 6.9 G/DL (ref 6–8.5)
PROTHROMBIN TIME: 13 SECONDS (ref 11.9–14.6)
RBC # BLD AUTO: 5.19 10*6/MM3 (ref 3.77–5.28)
SODIUM BLD-SCNC: 137 MMOL/L (ref 136–145)
WBC NRBC COR # BLD: 8.31 10*3/MM3 (ref 3.4–10.8)

## 2020-06-22 PROCEDURE — C1769 GUIDE WIRE: HCPCS | Performed by: INTERNAL MEDICINE

## 2020-06-22 PROCEDURE — 0 IOPAMIDOL PER 1 ML: Performed by: INTERNAL MEDICINE

## 2020-06-22 PROCEDURE — C1894 INTRO/SHEATH, NON-LASER: HCPCS | Performed by: INTERNAL MEDICINE

## 2020-06-22 PROCEDURE — 25010000002 DIPHENHYDRAMINE PER 50 MG: Performed by: INTERNAL MEDICINE

## 2020-06-22 PROCEDURE — 93458 L HRT ARTERY/VENTRICLE ANGIO: CPT | Performed by: INTERNAL MEDICINE

## 2020-06-22 PROCEDURE — 99153 MOD SED SAME PHYS/QHP EA: CPT | Performed by: INTERNAL MEDICINE

## 2020-06-22 PROCEDURE — L1830 KO IMMOB CANVAS LONG PRE OTS: HCPCS | Performed by: INTERNAL MEDICINE

## 2020-06-22 PROCEDURE — S0260 H&P FOR SURGERY: HCPCS | Performed by: INTERNAL MEDICINE

## 2020-06-22 PROCEDURE — 99152 MOD SED SAME PHYS/QHP 5/>YRS: CPT | Performed by: INTERNAL MEDICINE

## 2020-06-22 PROCEDURE — 25010000002 FENTANYL CITRATE (PF) 100 MCG/2ML SOLUTION: Performed by: INTERNAL MEDICINE

## 2020-06-22 PROCEDURE — 25010000002 HEPARIN (PORCINE): Performed by: INTERNAL MEDICINE

## 2020-06-22 PROCEDURE — 85610 PROTHROMBIN TIME: CPT | Performed by: INTERNAL MEDICINE

## 2020-06-22 PROCEDURE — 25010000002 MIDAZOLAM PER 1 MG: Performed by: INTERNAL MEDICINE

## 2020-06-22 PROCEDURE — C1760 CLOSURE DEV, VASC: HCPCS | Performed by: INTERNAL MEDICINE

## 2020-06-22 PROCEDURE — 80053 COMPREHEN METABOLIC PANEL: CPT | Performed by: INTERNAL MEDICINE

## 2020-06-22 PROCEDURE — 85025 COMPLETE CBC W/AUTO DIFF WBC: CPT | Performed by: INTERNAL MEDICINE

## 2020-06-22 RX ORDER — CARVEDILOL 6.25 MG/1
6.25 TABLET ORAL 2 TIMES DAILY WITH MEALS
COMMUNITY

## 2020-06-22 RX ORDER — DIPHENHYDRAMINE HYDROCHLORIDE 50 MG/ML
INJECTION INTRAMUSCULAR; INTRAVENOUS AS NEEDED
Status: DISCONTINUED | OUTPATIENT
Start: 2020-06-22 | End: 2020-06-22 | Stop reason: HOSPADM

## 2020-06-22 RX ORDER — ASPIRIN 81 MG/1
81 TABLET ORAL DAILY
COMMUNITY

## 2020-06-22 RX ORDER — MULTIPLE VITAMINS W/ MINERALS TAB 9MG-400MCG
1 TAB ORAL DAILY
COMMUNITY

## 2020-06-22 RX ORDER — MIDAZOLAM HYDROCHLORIDE 1 MG/ML
INJECTION INTRAMUSCULAR; INTRAVENOUS AS NEEDED
Status: DISCONTINUED | OUTPATIENT
Start: 2020-06-22 | End: 2020-06-22 | Stop reason: HOSPADM

## 2020-06-22 RX ORDER — AMLODIPINE BESYLATE 5 MG/1
5 TABLET ORAL DAILY
COMMUNITY

## 2020-06-22 RX ORDER — ACETAMINOPHEN 325 MG/1
650 TABLET ORAL EVERY 4 HOURS PRN
Status: DISCONTINUED | OUTPATIENT
Start: 2020-06-22 | End: 2020-06-22 | Stop reason: HOSPADM

## 2020-06-22 RX ORDER — SODIUM CHLORIDE 9 MG/ML
75 INJECTION, SOLUTION INTRAVENOUS CONTINUOUS
Status: DISCONTINUED | OUTPATIENT
Start: 2020-06-22 | End: 2020-06-22 | Stop reason: HOSPADM

## 2020-06-22 RX ORDER — SODIUM CHLORIDE 9 MG/ML
100 INJECTION, SOLUTION INTRAVENOUS CONTINUOUS
Status: DISCONTINUED | OUTPATIENT
Start: 2020-06-22 | End: 2020-06-22 | Stop reason: HOSPADM

## 2020-06-22 RX ORDER — FENTANYL CITRATE 50 UG/ML
INJECTION, SOLUTION INTRAMUSCULAR; INTRAVENOUS AS NEEDED
Status: DISCONTINUED | OUTPATIENT
Start: 2020-06-22 | End: 2020-06-22 | Stop reason: HOSPADM

## 2020-06-22 RX ORDER — SODIUM CHLORIDE 0.9 % (FLUSH) 0.9 %
10 SYRINGE (ML) INJECTION EVERY 12 HOURS SCHEDULED
Status: DISCONTINUED | OUTPATIENT
Start: 2020-06-22 | End: 2020-06-22 | Stop reason: HOSPADM

## 2020-06-22 RX ORDER — SODIUM CHLORIDE 0.9 % (FLUSH) 0.9 %
10 SYRINGE (ML) INJECTION AS NEEDED
Status: DISCONTINUED | OUTPATIENT
Start: 2020-06-22 | End: 2020-06-22 | Stop reason: HOSPADM

## 2020-06-22 RX ADMIN — SODIUM CHLORIDE 75 ML/HR: 9 INJECTION, SOLUTION INTRAVENOUS at 12:32

## 2020-06-24 NOTE — H&P
LOS: 0 days   Patient Care Team:  Germaine Richard MD as PCP - General (Family Medicine)  Susana Askew APRN as Nurse Practitioner (Family Medicine)  Trae Alvarez MD as Consulting Physician (Otolaryngology)    Chief Complaint:   Precordial chest pain         Subjective    Kajal Huston is a 74 y.o. femalewho is being seen  In cardiovascular the recent unit  Patient has had substernal chest discomfort  Also shortness of breath  Had nuclear stress test at [facility] which shows inferior ischemia  No presyncope  No syncope  No orthopnea  No paroxysmal nocturnal dyspnea  No bleeding disorders    Telemetry: no malignant arrhythmia. No significant pauses.    Review of Systems     Constitutional: No chills   Has fatigue   No fever.     HENT: Negative.    Eyes: Negative.      Respiratory: Negative for cough,   No chest wall soreness,   Shortness of breath,   no wheezing, no stridor.      Cardiovascular: As above    Gastrointestinal: Negative for abdominal distention,  No abdominal pain,   No blood in stool,   No constipation,   No diarrhea,   No nausea   No vomiting.     Endocrine: Negative.    Genitourinary: Negative for difficulty urinating, dysuria, flank pain and hematuria.     Musculoskeletal: Negative.    Skin: Negative for rash and wound.   Allergic/Immunologic: Negative.      Neurological: Negative for dizziness, syncope, weakness,   No light-headedness  No  headaches.     Hematological: Does not bruise/bleed easily.     Psychiatric/Behavioral: Negative for agitation or behavioral problems,   No confusion,   the patient is  nervous/anxious.       History:   Past Medical History:   Diagnosis Date   • CHF (congestive heart failure) (CMS/HCC)    • Diabetes (CMS/HCC)    • Hypercholesteremia    • Hypertension    • Thyroid disorder      Past Surgical History:   Procedure Laterality Date   • CARDIAC CATHETERIZATION Bilateral 6/22/2020    Procedure: Coronary angiography;  Surgeon: Preeti  MD Chao;  Location:  [facility];  Service: Cardiology;  Laterality: Bilateral;   • CARDIAC CATHETERIZATION N/A 2020    Procedure: Left ventriculography;  Surgeon: Chao Álvarez MD;  Location:  [facility];  Service: Cardiology;  Laterality: N/A;   • CARDIAC CATHETERIZATION N/A 2020    Procedure: Left Heart Cath;  Surgeon: Chao Álvarez MD;  Location:  [facility];  Service: Cardiology;  Laterality: N/A;   • CORONARY ANGIOPLASTY     • HYSTERECTOMY     • TONSILLECTOMY       Social History     Socioeconomic History   • Marital status:      Spouse name: Not on file   • Number of children: Not on file   • Years of education: Not on file   • Highest education level: Not on file   Tobacco Use   • Smoking status: Former Smoker     Last attempt to quit: 3/6/2003     Years since quittin.3   • Smokeless tobacco: Never Used   Substance and Sexual Activity   • Alcohol use: No   • Drug use: No   • Sexual activity: Defer     Family History   Problem Relation Age of Onset   • Diabetes Mother        Labs:  WBC WBC   Date Value Ref Range Status   2020 8.31 3.40 - 10.80 10*3/mm3 Final      HGB Hemoglobin   Date Value Ref Range Status   2020 14.5 12.0 - 15.9 g/dL Final      HCT Hematocrit   Date Value Ref Range Status   2020 44.2 34.0 - 46.6 % Final      Platelets Platelets   Date Value Ref Range Status   2020 352 140 - 450 10*3/mm3 Final      MCV MCV   Date Value Ref Range Status   2020 85.2 79.0 - 97.0 fL Final        Results from last 7 days   Lab Units 20  1229   SODIUM mmol/L 137   POTASSIUM mmol/L 4.3   CHLORIDE mmol/L 102   CO2 mmol/L 23.0   BUN mg/dL 12   CREATININE mg/dL 0.68   CALCIUM mg/dL 9.2   BILIRUBIN mg/dL 0.6   ALK PHOS U/L 60   ALT (SGPT) U/L 14   AST (SGOT) U/L 24   GLUCOSE mg/dL 245*   No results found for: CKTOTAL, CKMB, CKMBINDEX, TROPONINI, TROPONINT  PT/INR:    Protime   Date Value Ref Range Status  "  06/22/2020 13.0 11.9 - 14.6 Seconds Final   /  INR   Date Value Ref Range Status   06/22/2020 1.02 0.91 - 1.09 Final       Imaging Results (Last 72 Hours)     ** No results found for the last 72 hours. **          Objective     No Known Allergies    Medication Review: Performed  No current facility-administered medications for this encounter.      Current Outpatient Medications   Medication Sig Dispense Refill   • amLODIPine (NORVASC) 5 MG tablet Take 5 mg by mouth Daily.     • aspirin 81 MG EC tablet Take 81 mg by mouth Daily.     • carvedilol (COREG) 6.25 MG tablet Take 6.25 mg by mouth 2 (Two) Times a Day With Meals.     • Cholecalciferol (VITAMIN D3 PO) Take 1 tablet by mouth.     • Cyanocobalamin (VITAMIN B-12 PO) Take 1 tablet by mouth Daily.     • glipiZIDE (GLUCOTROL) 5 MG ER tablet Take 5 mg by mouth Daily.     • levothyroxine (SYNTHROID, LEVOTHROID) 50 MCG tablet Take 50 mcg by mouth Daily.     • lisinopril (PRINIVIL,ZESTRIL) 20 MG tablet Take 20 mg by mouth Daily.     • lovastatin (MEVACOR) 40 MG tablet Take 40 mg by mouth Every Night.     • Multiple Vitamins-Minerals (MULTIVITAMIN WITH MINERALS) tablet tablet Take 1 tablet by mouth Daily.     • NOVOLIN 70/30 RELION (70-30) 100 UNIT/ML injection Inject  under the skin into the appropriate area as directed 2 (Two) Times a Day With Meals. 30 units in the morning and 10 units at night.     • furosemide (LASIX) 40 MG tablet Take 40 mg by mouth Daily As Needed.     • metFORMIN XR (GLUCOPHAGE-XR) 500 MG 24 hr tablet Take 1,000 mg by mouth 2 (two) times a day.         Vital Sign Min/Max for last 24 hours  No data recorded   No data recorded   No data recorded   No data recorded   No data recorded   No data recorded   No data recorded     Flowsheet Rows      First Filed Value   Admission Height  160 cm (63\") Documented at 06/22/2020 1209   Admission Weight  69.9 kg (154 lb 2 oz) Documented at 06/22/2020 1209               Physical Exam:    General Appearance: " Awake, alert, in no acute distress  Eyes: Pupils equal and reactive    Ears: Appear intact with no abnormalities noted  Nose: Nares normal, no drainage  Neck: supple, trachea midline, no carotid bruit and no JVD  Back: no kyphosis present,    Lungs: respirations regular, respirations even and respirations unlabored    Heart: normal S1, S2,  2/6 pansystolic murmur in the left sternal border,    no rub and no click    Abdomen: normal bowel sounds, no tenderness   Skin: no bleeding, bruising or rash  Extremities: no cyanosis  Psychiatric/Behavioral: Negative for agitation, behavioral problems, confusion, the patient does  appear to be nervous/anxious.          Results Review:   I reviewed the patient's new clinical results.  I reviewed the patient's new imaging results and agree with the interpretation.  I reviewed the patient's other test results and agree with the interpretation  I personally viewed and interpreted the patient's EKG/Telemetry data  Discussed with patient    Reviewed available prior notes, consults, prior visits, laboratory findings, radiology and cardiology relevant reports.   Updated chart as applicable.   I have reviewed the patient's medical history in detail and updated the computerized patient record as relevant.      Updated patient regarding any new or relevant abnormalities on review of records or any new findings on physical exam.   Mentioned to patient about purpose of visit and desirable health short and long term goals and objectives.     Assessment/Plan       Precordial chest pain  Abnormal cardiac stress test raising suspicion for underlying hemodynamically significant obstructive coronary artery disease .       Plan    Recommend cardiac catheterization, selective coronary angiography, left ventriculography and percutaneous coronary intervention with application of arteriotomy hemostatic closure device.    I discussed cardiac catheterization, the procedure, risks (including bleeding,  infection, vascular damage [including minor oozing, bruising, bleeding, and up to and including but not limited to the need for vascular surgery, emergency cardiothoracic surgery, contrast reaction, renal failure, respiratory failure, heart attack, stroke, arrhythmia and even death), benefits, and alternatives and the patient has voiced understanding and is willing to proceed.    Adequate pre-hydration and post cardiac catheterization hydration.  Premedications as required and indicated for cardiac catheterization.    No contraindication to drug eluting stent placement if required  Further recommendations pending results of cardiac catheterization       Chao Álvarez MD  06/24/20  05:08    EMR Dragon/Transcription was used to dictate part of this note

## 2021-01-15 DIAGNOSIS — Z23 NEED FOR VACCINATION: ICD-10-CM

## 2024-09-19 ENCOUNTER — TRANSCRIBE ORDERS (OUTPATIENT)
Dept: ADMINISTRATIVE | Facility: HOSPITAL | Age: 79
End: 2024-09-19
Payer: MEDICARE

## 2024-09-19 DIAGNOSIS — R10.11 ABDOMINAL PAIN, RIGHT UPPER QUADRANT: Primary | ICD-10-CM

## 2024-10-10 ENCOUNTER — HOSPITAL ENCOUNTER (OUTPATIENT)
Dept: NUCLEAR MEDICINE | Facility: HOSPITAL | Age: 79
Discharge: HOME | End: 2024-10-10
Payer: MEDICARE

## 2024-10-10 DIAGNOSIS — R10.11 ABDOMINAL PAIN, RIGHT UPPER QUADRANT: ICD-10-CM

## 2024-10-10 PROCEDURE — 0 TECHNETIUM TC 99M MEBROFENIN KIT: Performed by: FAMILY MEDICINE

## 2024-10-10 PROCEDURE — 78226 HEPATOBILIARY SYSTEM IMAGING: CPT

## 2024-10-10 PROCEDURE — A9537 TC99M MEBROFENIN: HCPCS | Performed by: FAMILY MEDICINE

## 2024-10-10 RX ORDER — KIT FOR THE PREPARATION OF TECHNETIUM TC 99M MEBROFENIN 45 MG/10ML
1 INJECTION, POWDER, LYOPHILIZED, FOR SOLUTION INTRAVENOUS
Status: COMPLETED | OUTPATIENT
Start: 2024-10-10 | End: 2024-10-10

## 2024-10-10 RX ADMIN — MEBROFENIN 1 DOSE: 45 INJECTION, POWDER, LYOPHILIZED, FOR SOLUTION INTRAVENOUS at 09:00

## 2024-10-14 ENCOUNTER — TELEPHONE (OUTPATIENT)
Dept: SURGERY | Facility: CLINIC | Age: 79
End: 2024-10-14
Payer: MEDICARE

## 2024-10-14 NOTE — TELEPHONE ENCOUNTER
Patient's daughter called and requested the appt for 10/17 be moved to 10/21. I advised PT that if symptoms got worse or anything changed to go to the ER. PT emergency contact voiced understanding.    CBC  10/14

## 2024-10-21 ENCOUNTER — OFFICE VISIT (OUTPATIENT)
Dept: SURGERY | Facility: CLINIC | Age: 79
End: 2024-10-21
Payer: MEDICARE

## 2024-10-21 VITALS
BODY MASS INDEX: 23.04 KG/M2 | OXYGEN SATURATION: 98 % | HEIGHT: 63 IN | WEIGHT: 130 LBS | SYSTOLIC BLOOD PRESSURE: 128 MMHG | HEART RATE: 60 BPM | DIASTOLIC BLOOD PRESSURE: 76 MMHG

## 2024-10-21 DIAGNOSIS — Z79.01 CHRONIC ANTICOAGULATION: ICD-10-CM

## 2024-10-21 DIAGNOSIS — K81.1 CHRONIC CHOLECYSTITIS: Primary | ICD-10-CM

## 2024-10-21 RX ORDER — INDOCYANINE GREEN AND WATER 25 MG
3.75 KIT INJECTION ONCE
OUTPATIENT
Start: 2024-10-21 | End: 2024-10-21

## 2024-10-21 RX ORDER — METOPROLOL TARTRATE 50 MG
50 TABLET ORAL DAILY
COMMUNITY

## 2024-10-21 RX ORDER — AMIODARONE HYDROCHLORIDE 200 MG/1
100 TABLET ORAL DAILY
COMMUNITY

## 2024-10-21 RX ORDER — DAPAGLIFLOZIN 10 MG/1
TABLET, FILM COATED ORAL
COMMUNITY

## 2024-10-21 RX ORDER — ATORVASTATIN CALCIUM 80 MG/1
80 TABLET, FILM COATED ORAL DAILY
COMMUNITY

## 2024-10-21 RX ORDER — LOSARTAN POTASSIUM 50 MG/1
50 TABLET ORAL DAILY
COMMUNITY

## 2024-10-21 RX ORDER — SPIRONOLACTONE 25 MG/1
25 TABLET ORAL DAILY
COMMUNITY

## 2024-10-21 RX ORDER — ENOXAPARIN SODIUM 100 MG/ML
30 INJECTION SUBCUTANEOUS DAILY
OUTPATIENT
Start: 2024-10-21

## 2024-10-21 NOTE — PROGRESS NOTES
Office New Patient History and Physical:     Referring Provider: No ref. provider found    Chief Complaint   Patient presents with    Abdominal Pain       Subjective .     History of present illness:  Kajal Huston is a 78 y.o. female who presents for a gallbladder consultation.   History of Present Illness  She has been experiencing intermittent pain under her right rib cage for the past 6 weeks, which has recently become more frequent. The pain is severe enough to disrupt her sleep on some nights. Accompanying this pain, she experiences nausea and vomiting during intense episodes, occurring a few times per week. She has not identified any specific food triggers for these symptoms.    PSH includes hysterectomy. She is currently on Eliquis for atrial fibrillation, managed by her cardiologist, Dr. Álvarez. She also takes baby aspirin, which she did not take this morning due to uncertainty about her condition.     Additionally, she had a nosebleed for about a month. She does not smoke and uses Tylenol for pain management.       History  Past Medical History:   Diagnosis Date    CHF (congestive heart failure)     Diabetes     Hypercholesteremia     Hypertension     Thyroid disorder    ,   Past Surgical History:   Procedure Laterality Date    CARDIAC CATHETERIZATION Bilateral 6/22/2020    Procedure: Coronary angiography;  Surgeon: Chao Álvarez MD;  Location:  [facility];  Service: Cardiology;  Laterality: Bilateral;    CARDIAC CATHETERIZATION N/A 6/22/2020    Procedure: Left ventriculography;  Surgeon: Chao Álvarez MD;  Location:  [facility];  Service: Cardiology;  Laterality: N/A;    CARDIAC CATHETERIZATION N/A 6/22/2020    Procedure: Left Heart Cath;  Surgeon: Chao Álvarez MD;  Location:  [facility];  Service: Cardiology;  Laterality: N/A;    CORONARY ANGIOPLASTY      HYSTERECTOMY      TONSILLECTOMY     ,   Family History   Problem Relation Age of Onset     Diabetes Mother    ,   Social History     Tobacco Use    Smoking status: Former     Current packs/day: 0.00     Types: Cigarettes     Quit date: 3/6/2003     Years since quittin.6     Passive exposure: Past    Smokeless tobacco: Never   Vaping Use    Vaping status: Never Used   Substance Use Topics    Alcohol use: No    Drug use: No   , (Not in a hospital admission)   and Allergies:  Patient has no known allergies.    Current Outpatient Medications:     amiodarone (PACERONE) 200 MG tablet, Take 0.5 tablets by mouth Daily., Disp: , Rfl:     apixaban (ELIQUIS) 2.5 MG tablet tablet, Take 1 tablet by mouth 2 (Two) Times a Day., Disp: , Rfl:     aspirin 81 MG EC tablet, Take 1 tablet by mouth Daily., Disp: , Rfl:     atorvastatin (LIPITOR) 80 MG tablet, Take 1 tablet by mouth Daily., Disp: , Rfl:     Cholecalciferol (VITAMIN D3 PO), Take 1 tablet by mouth., Disp: , Rfl:     dapagliflozin Propanediol (Farxiga) 10 MG tablet, Take  by mouth., Disp: , Rfl:     furosemide (LASIX) 40 MG tablet, Take 1 tablet by mouth Daily As Needed., Disp: , Rfl:     glipiZIDE (GLUCOTROL) 5 MG ER tablet, Take 5 mg by mouth Daily. (Patient taking differently: Take 2 tablets by mouth Daily.), Disp: , Rfl:     ipratropium (ATROVENT HFA) 17 MCG/ACT inhaler, Inhale 2 puffs 4 (Four) Times a Day., Disp: , Rfl:     levothyroxine (SYNTHROID, LEVOTHROID) 50 MCG tablet, Take 1 tablet by mouth Daily., Disp: , Rfl:     losartan (COZAAR) 50 MG tablet, Take 1 tablet by mouth Daily., Disp: , Rfl:     metFORMIN XR (GLUCOPHAGE-XR) 500 MG 24 hr tablet, Take 2 tablets by mouth 2 (two) times a day., Disp: , Rfl:     metoprolol tartrate (LOPRESSOR) 50 MG tablet, Take 1 tablet by mouth Daily., Disp: , Rfl:     Multiple Vitamins-Minerals (MULTIVITAMIN WITH MINERALS) tablet tablet, Take 1 tablet by mouth Daily., Disp: , Rfl:     NOVOLIN 70/30 RELION (70-30) 100 UNIT/ML injection, Inject  under the skin into the appropriate area as directed 2 (Two) Times a Day  "With Meals. 30 units in the morning and 10 units at night., Disp: , Rfl:     spironolactone (ALDACTONE) 25 MG tablet, Take 1 tablet by mouth Daily., Disp: , Rfl:     Review of Systems    Review of Systems - General ROS: negative  ENT ROS: negative  Respiratory ROS: no cough, shortness of breath, or wheezing  Cardiovascular ROS: no chest pain or dyspnea on exertion  Gastrointestinal ROS: positive for - abdominal pain and nausea/vomiting  Genito-Urinary ROS: no dysuria, trouble voiding, or hematuria  Dermatological ROS: negative   Breast ROS: negative for breast lumps  Hematological and Lymphatic ROS: negative  Musculoskeletal ROS: negative   Neurological ROS: no TIA or stroke symptoms    Psychological ROS: negative  Endocrine ROS: negative    Objective       Vital Signs   /76 (BP Location: Right arm, Patient Position: Sitting, Cuff Size: Adult)   Pulse 60   Ht 160 cm (63\")   Wt 59 kg (130 lb)   SpO2 98%   BMI 23.03 kg/m²      Physical Exam:  General appearance - alert, well appearing, and in no distress  Mental status - alert, oriented to person, place, and time  Eyes - pupils equal and reactive, extraocular eye movements intact  Neck - supple, no significant adenopathy  Abdomen - soft, nontender, nondistended, no masses or organomegaly  Neurological - alert, oriented, normal speech, no focal findings or movement disorder noted  Physical Exam         Results Review:     The following data was reviewed by: Mercedes Hutson MD on 10/21/2024:    US outside films (09/18/2024 00:00)   Thickening of the gallbladder wall at 5 mm. No gallstones. No pericholecystic fluid.   NM HIDA SCAN WITHOUT PHARMACOLOGICAL INTERVENTION (10/10/2024 10:57)   Nonvisualization of the gallbladder on images up to 90 minutes,compatible with cystic duct obstruction. The common bile duct is patent.    Assessment & Plan       Diagnoses and all orders for this visit:    1. Chronic cholecystitis (Primary)  -     Case Request; " Standing  -     XR chest 1 vw; Future  -     ECG 12 Lead; Future  -     Enoxaparin Sodium (LOVENOX) syringe 30 mg  -     CBC & Differential; Future  -     Comprehensive Metabolic Panel; Future  -     ceFAZolin (ANCEF) 2,000 mg in sodium chloride 0.9 % 100 mL IVPB  -     indocyanine green (IC-GREEN) injection 3.75 mg  -     Case Request    2. Chronic anticoagulation    Other orders  -     Follow Anesthesia Guidelines / Protocol; Future  -     Follow Anesthesia Guidelines / Protocol; Standing  -     Verify / Perform Chlorhexidine Skin Prep; Standing  -     Provide NPO Instructions to Patient; Future  -     Chlorhexidine Skin Prep; Future  -     Notify physician (specify); Standing  -     Instructions on coughing, deep breathing, and incentive spirometry.; Standing  -     Oxygen Therapy-; Standing  -     Place Sequential Compression Device; Standing  -     Maintain Sequential Compression Device; Standing         Kajal Huston is a 78 y.o. female with chronic cholecystitis. History and imaging above are consistent with this diagnosis. I did  the patient that there is a small chance that cholecystectomy does not completely resolve the pain, and that if this is the case, we will refer the patient to GI for an EGD. However, the symptoms including their nature, timing and duration are most consistent with biliary colic. I reviewed the gallbladder anatomy with the patient, and after a thorough discussion of risks (including bleeding, infection, bile leak, damage to surrounding structures including the common bile duct, and risks of anesthesia) and benefits, the patient wishes to proceed with laparoscopic robotic assisted cholecystectomy, possible cholangiogram. [The patient has an appointment for pre-op work up including EKG, CXR, CMP and CBC.  The patient is currently scheduled for laparoscopic robotic assisted cholecystectomy,with ICG guided cholangiogram possible fluoroscopy on 11/7/24. Dr. Richard did get  clearance from Dr. Álvarez to hold Eliquis x 48 hours for surgery. Will NOT Have him stop his ASA.     This is a chronic problem with progression. I have reviewed the above imaging and ordered the above prework. She is at increased risk of perioperative complications 2/2 her chronic anticoagulation.     I also discussed with the patient post operative pain management including multimodal pain control utilizing Tylenol, ibuprofen, and Roxicodone for breakthrough pain. I will plan to give the patient 10 tabs of 5mg Roxicodone post operatively for breakthrough pain    BMI is within normal parameters. No other follow-up for BMI required.    Mercedes Hutson MD  10/22/24  20:25 CDT    Patient or patient representative verbalized consent for the use of Ambient Listening during the visit with  Mercedes Hutson MD for chart documentation. 10/22/2024  20:31 CDT

## 2024-10-31 ENCOUNTER — HOSPITAL ENCOUNTER (OUTPATIENT)
Dept: GENERAL RADIOLOGY | Facility: HOSPITAL | Age: 79
Discharge: HOME | End: 2024-10-31
Payer: MEDICARE

## 2024-10-31 ENCOUNTER — PRE-ADMISSION TESTING (OUTPATIENT)
Dept: PREADMISSION TESTING | Facility: HOSPITAL | Age: 79
End: 2024-10-31
Payer: MEDICARE

## 2024-10-31 VITALS
SYSTOLIC BLOOD PRESSURE: 131 MMHG | BODY MASS INDEX: 23.16 KG/M2 | DIASTOLIC BLOOD PRESSURE: 44 MMHG | HEART RATE: 58 BPM | OXYGEN SATURATION: 95 % | WEIGHT: 130.73 LBS | RESPIRATION RATE: 16 BRPM | HEIGHT: 63 IN

## 2024-10-31 DIAGNOSIS — K81.1 CHRONIC CHOLECYSTITIS: ICD-10-CM

## 2024-10-31 LAB
ALBUMIN SERPL-MCNC: 3.8 G/DL (ref 3.5–5.2)
ALBUMIN/GLOB SERPL: 1.3 G/DL
ALP SERPL-CCNC: 91 U/L (ref 39–117)
ALT SERPL W P-5'-P-CCNC: 19 U/L (ref 1–33)
ANION GAP SERPL CALCULATED.3IONS-SCNC: 11 MMOL/L (ref 5–15)
AST SERPL-CCNC: 18 U/L (ref 1–32)
BASOPHILS # BLD AUTO: 0.1 10*3/MM3 (ref 0–0.2)
BASOPHILS NFR BLD AUTO: 0.8 % (ref 0–1.5)
BILIRUB SERPL-MCNC: 0.3 MG/DL (ref 0–1.2)
BUN SERPL-MCNC: 48 MG/DL (ref 8–23)
BUN/CREAT SERPL: 17.6 (ref 7–25)
CALCIUM SPEC-SCNC: 9 MG/DL (ref 8.6–10.5)
CHLORIDE SERPL-SCNC: 97 MMOL/L (ref 98–107)
CO2 SERPL-SCNC: 22 MMOL/L (ref 22–29)
CREAT SERPL-MCNC: 2.73 MG/DL (ref 0.57–1)
DEPRECATED RDW RBC AUTO: 61 FL (ref 37–54)
EGFRCR SERPLBLD CKD-EPI 2021: 17.4 ML/MIN/1.73
EOSINOPHIL # BLD AUTO: 0.18 10*3/MM3 (ref 0–0.4)
EOSINOPHIL NFR BLD AUTO: 1.5 % (ref 0.3–6.2)
ERYTHROCYTE [DISTWIDTH] IN BLOOD BY AUTOMATED COUNT: 20.1 % (ref 12.3–15.4)
GLOBULIN UR ELPH-MCNC: 3 GM/DL
GLUCOSE SERPL-MCNC: 317 MG/DL (ref 65–99)
HCT VFR BLD AUTO: 39.9 % (ref 34–46.6)
HGB BLD-MCNC: 12 G/DL (ref 12–15.9)
IMM GRANULOCYTES # BLD AUTO: 0.04 10*3/MM3 (ref 0–0.05)
IMM GRANULOCYTES NFR BLD AUTO: 0.3 % (ref 0–0.5)
LYMPHOCYTES # BLD AUTO: 1.55 10*3/MM3 (ref 0.7–3.1)
LYMPHOCYTES NFR BLD AUTO: 12.7 % (ref 19.6–45.3)
MCH RBC QN AUTO: 25 PG (ref 26.6–33)
MCHC RBC AUTO-ENTMCNC: 30.1 G/DL (ref 31.5–35.7)
MCV RBC AUTO: 83.1 FL (ref 79–97)
MONOCYTES # BLD AUTO: 1.02 10*3/MM3 (ref 0.1–0.9)
MONOCYTES NFR BLD AUTO: 8.4 % (ref 5–12)
NEUTROPHILS NFR BLD AUTO: 76.3 % (ref 42.7–76)
NEUTROPHILS NFR BLD AUTO: 9.29 10*3/MM3 (ref 1.7–7)
NRBC BLD AUTO-RTO: 0 /100 WBC (ref 0–0.2)
PLATELET # BLD AUTO: 383 10*3/MM3 (ref 140–450)
PMV BLD AUTO: 11.4 FL (ref 6–12)
POTASSIUM SERPL-SCNC: 5.7 MMOL/L (ref 3.5–5.2)
PROT SERPL-MCNC: 6.8 G/DL (ref 6–8.5)
QT INTERVAL: 492 MS
QTC INTERVAL: 474 MS
RBC # BLD AUTO: 4.8 10*6/MM3 (ref 3.77–5.28)
SODIUM SERPL-SCNC: 130 MMOL/L (ref 136–145)
WBC NRBC COR # BLD AUTO: 12.18 10*3/MM3 (ref 3.4–10.8)

## 2024-10-31 PROCEDURE — 36415 COLL VENOUS BLD VENIPUNCTURE: CPT

## 2024-10-31 PROCEDURE — 80053 COMPREHEN METABOLIC PANEL: CPT

## 2024-10-31 PROCEDURE — 85025 COMPLETE CBC W/AUTO DIFF WBC: CPT

## 2024-10-31 PROCEDURE — 71045 X-RAY EXAM CHEST 1 VIEW: CPT

## 2024-10-31 PROCEDURE — 93005 ELECTROCARDIOGRAM TRACING: CPT

## 2024-10-31 RX ORDER — METOPROLOL SUCCINATE 50 MG/1
50 TABLET, EXTENDED RELEASE ORAL DAILY
COMMUNITY

## 2024-10-31 RX ORDER — GLIPIZIDE 10 MG/1
10 TABLET ORAL DAILY
Status: ON HOLD | COMMUNITY
End: 2024-11-07

## 2024-10-31 NOTE — DISCHARGE INSTRUCTIONS
How to Use Chlorhexidine Before Surgery  Chlorhexidine gluconate (CHG) is a germ-killing (antiseptic) solution that is used to clean the skin. It can get rid of the bacteria that normally live on the skin and can keep them away for about 24 hours. To clean your skin with CHG, you may be given:  A CHG solution to use in the shower or as part of a sponge bath.  A prepackaged cloth that contains CHG.  Cleaning your skin with CHG may help lower the risk for infection:  While you are staying in the intensive care unit of the hospital.  If you have a vascular access, such as a central line, to provide short-term or long-term access to your veins.  If you have a catheter to drain urine from your bladder.  If you are on a ventilator. A ventilator is a machine that helps you breathe by moving air in and out of your lungs.  After surgery.  What are the risks?  Risks of using CHG include:  A skin reaction.  Hearing loss, if CHG gets in your ears and you have a perforated eardrum.  Eye injury, if CHG gets in your eyes and is not rinsed out.  The CHG product catching fire.  Make sure that you avoid smoking and flames after applying CHG to your skin.  Do not use CHG:  If you have a chlorhexidine allergy or have previously reacted to chlorhexidine.  On babies younger than 2 months of age.  How to use CHG solution  Use CHG only as told by your health care provider, and follow the instructions on the label.  Use the full amount of CHG as directed. Usually, this is one bottle.  During a shower    Follow these steps when using CHG solution during a shower (unless your health care provider gives you different instructions):  Start the shower.  Use your normal soap and shampoo to wash your face and hair.  Turn off the shower or move out of the shower stream.  Pour the CHG onto a clean washcloth. Do not use any type of brush or rough-edged sponge.  Starting at your neck, lather your body down to your toes. Make sure you follow these  instructions:  If you will be having surgery, pay special attention to the part of your body where you will be having surgery. Scrub this area for at least 1 minute.  Do not use CHG on your head or face. If the solution gets into your ears or eyes, rinse them well with water.  Avoid your genital area.  Avoid any areas of skin that have broken skin, cuts, or scrapes.  Scrub your back and under your arms. Make sure to wash skin folds.  Let the lather sit on your skin for 1-2 minutes or as long as told by your health care provider.  Thoroughly rinse your entire body in the shower. Make sure that all body creases and crevices are rinsed well.  Dry off with a clean towel. Do not put any substances on your body afterward--such as powder, lotion, or perfume--unless you are told to do so by your health care provider. Only use lotions that are recommended by the .  Put on clean clothes or pajamas.  If it is the night before your surgery, sleep in clean sheets.     During a sponge bath  Follow these steps when using CHG solution during a sponge bath (unless your health care provider gives you different instructions):  Use your normal soap and shampoo to wash your face and hair.  Pour the CHG onto a clean washcloth.  Starting at your neck, lather your body down to your toes. Make sure you follow these instructions:  If you will be having surgery, pay special attention to the part of your body where you will be having surgery. Scrub this area for at least 1 minute.  Do not use CHG on your head or face. If the solution gets into your ears or eyes, rinse them well with water.  Avoid your genital area.  Avoid any areas of skin that have broken skin, cuts, or scrapes.  Scrub your back and under your arms. Make sure to wash skin folds.  Let the lather sit on your skin for 1-2 minutes or as long as told by your health care provider.  Using a different clean, wet washcloth, thoroughly rinse your entire body. Make sure that  all body creases and crevices are rinsed well.  Dry off with a clean towel. Do not put any substances on your body afterward--such as powder, lotion, or perfume--unless you are told to do so by your health care provider. Only use lotions that are recommended by the .  Put on clean clothes or pajamas.  If it is the night before your surgery, sleep in clean sheets.  How to use CHG prepackaged cloths  Only use CHG cloths as told by your health care provider, and follow the instructions on the label.  Use the CHG cloth on clean, dry skin.  Do not use the CHG cloth on your head or face unless your health care provider tells you to.  When washing with the CHG cloth:  Avoid your genital area.  Avoid any areas of skin that have broken skin, cuts, or scrapes.  Before surgery    Follow these steps when using a CHG cloth to clean before surgery (unless your health care provider gives you different instructions):  Using the CHG cloth, vigorously scrub the part of your body where you will be having surgery. Scrub using a back-and-forth motion for 3 minutes. The area on your body should be completely wet with CHG when you are done scrubbing.  Do not rinse. Discard the cloth and let the area air-dry. Do not put any substances on the area afterward, such as powder, lotion, or perfume.  Put on clean clothes or pajamas.  If it is the night before your surgery, sleep in clean sheets.     For general bathing  Follow these steps when using CHG cloths for general bathing (unless your health care provider gives you different instructions).  Use a separate CHG cloth for each area of your body. Make sure you wash between any folds of skin and between your fingers and toes. Wash your body in the following order, switching to a new cloth after each step:  The front of your neck, shoulders, and chest.  Both of your arms, under your arms, and your hands.  Your stomach and groin area, avoiding the genitals.  Your right leg and  foot.  Your left leg and foot.  The back of your neck, your back, and your buttocks.  Do not rinse. Discard the cloth and let the area air-dry. Do not put any substances on your body afterward--such as powder, lotion, or perfume--unless you are told to do so by your health care provider. Only use lotions that are recommended by the .  Put on clean clothes or pajamas.  Contact a health care provider if:  Your skin gets irritated after scrubbing.  You have questions about using your solution or cloth.  You swallow any chlorhexidine. Call your local poison control center (1-702.805.8503 in the [place]  Get help right away if:  Your eyes itch badly, or they become very red or swollen.  Your skin itches badly and is red or swollen.  Your hearing changes.  You have trouble seeing.  You have swelling or tingling in your mouth or throat.  You have trouble breathing.  These symptoms may represent a serious problem that is an emergency. Do not wait to see if the symptoms will go away. Get medical help right away. Call your local emergency services (389 in the [place] Do not drive yourself to the hospital.  Summary  Chlorhexidine gluconate (CHG) is a germ-killing (antiseptic) solution that is used to clean the skin. Cleaning your skin with CHG may help to lower your risk for infection.  You may be given CHG to use for bathing. It may be in a bottle or in a prepackaged cloth to use on your skin. Carefully follow your health care provider's instructions and the instructions on the product label.  Do not use CHG if you have a chlorhexidine allergy.  Contact your health care provider if your skin gets irritated after scrubbing.  This information is not intended to replace advice given to you by your health care provider. Make sure you discuss any questions you have with your health care provider.  Document Revised: 04/17/2023 Document Reviewed: 02/28/2022  [employer] Patient Education © 2023 [employer]      Preparing  for Surgery  Follow these instructions before the procedure:  Several days or weeks before your procedure        Ask your health care provider about:  Changing or stopping your regular medicines. This is especially important if you are taking diabetes medicines or blood thinners.  Taking medicines such as aspirin and ibuprofen. These medicines can thin your blood. Do not take these medicines unless your health care provider tells you to take them.  Taking over-the-counter medicines, vitamins, herbs, and supplements.    Contact your surgeon if you:  Develop a fever of more than 100.4°F (38°C) or other feelings of illness during the 48 hours before your surgery.  Have symptoms that get worse.  Have questions or concerns about your surgery.  If you are going home the same day of your surgery you will need to arrange for a responsible adult, age 18 years old or older, to drive you home from the hospital and stay with you for 24 hours. Verification of the  will be made prior to any procedure requiring sedation. You may not go home in a taxi or any form of public transportation by yourself.     Day before your procedure  Medication(s) you need to stop the day before your surgery: losartan    24 hours before your procedure DO NOT drink alcoholic beverages or smoke.  24 hours before your procedure STOP taking Erectile Dysfunction medication (i.e.,Cialis, Viagra)   You may be asked to shower with a germ-killing soap.  Day of your procedure   You may take the following medication(s) the morning of surgery with a sip of water: toprol       8 hours before your scheduled arrival time, STOP all food, any dairy products, and full liquids. This includes hard candy, chewing gum or mints. This is extremely important to prevent serious complications.     Up to 2 hours before your scheduled arrival time, you may have clear liquids no cream, powder, or pulp of any kind. Safe options are water, black coffee, plain tea, soda,  Gatorade/Powerade, clear broth, apple juice.    2 hours before your scheduled arrival time, STOP drinking clear liquids.    You may need to take another shower with a germ-killing soap before you leave home in the morning. Do not use perfumes, colognes, or body lotions.  Wear comfortable loose-fitting clothing.  Remove all jewelry including body piercing and rings, dark colored nail polish, and make up prior to arrival at the hospital. Leave all valuables at home.   Bring your hearing aids if you rely on them.  Do not wear contact lenses. If you wear eyeglasses remember to bring a case to store them in while you are in surgery.  Do not use denture adhesives since you will be asked to remove them during your surgery.    You do not need to bring your home medications into the hospital.   Bring your sleep apnea device with you on the day of your surgery (if this applies to you).  If you wear portable oxygen, bring it with you.   If you are staying overnight, you may bring a bag of items you may need such as slippers, robe and a change of clothes for your discharge. You may want to leave these items in the car until you are ready for them since your family will take your belongings when you leave the pre-operative area.  Arrive at the hospital as scheduled by the office. You will be asked to arrive 2 hours prior to your surgery time in order to prepare for your procedure.  When you arrive at the hospital  Go to the registration desk located at the main entrance of the hospital.  After registration is completed, you will be given a beeper and a sticker sheet. Take the stickers to Outpatient Surgery and place in the tray at the end of the desk to notify the staff that you have arrived and registered.   Return to the lobby to wait. You are not always called back according to the time of arrival but rather the time your doctor will be ready.  When your beeper lights up and vibrates proceed through the double doors, under  the stairs, and a member of the Outpatient Surgery staff will escort you to your preoperative room.

## 2024-11-06 ENCOUNTER — TELEPHONE (OUTPATIENT)
Dept: SURGERY | Facility: CLINIC | Age: 79
End: 2024-11-06
Payer: MEDICARE

## 2024-11-06 LAB — QT INTERVAL: 492 MS

## 2024-11-06 NOTE — TELEPHONE ENCOUNTER
Called Kajal to confirm her surgery date 11/07/2024 with an arrival time of 8:00AM.   Reminded her not to eat or drink after midnight.   Let her know to come through the main entrance of the hospital and check in at main registration.      Confirmed with patient.

## 2024-11-07 ENCOUNTER — ANESTHESIA (OUTPATIENT)
Dept: PERIOP | Facility: HOSPITAL | Age: 79
End: 2024-11-07
Payer: MEDICARE

## 2024-11-07 ENCOUNTER — HOSPITAL ENCOUNTER (OUTPATIENT)
Facility: HOSPITAL | Age: 79
Setting detail: OBSERVATION
Discharge: HOME | End: 2024-11-09
Attending: STUDENT IN AN ORGANIZED HEALTH CARE EDUCATION/TRAINING PROGRAM | Admitting: INTERNAL MEDICINE
Payer: MEDICARE

## 2024-11-07 ENCOUNTER — ANESTHESIA EVENT (OUTPATIENT)
Dept: PERIOP | Facility: HOSPITAL | Age: 79
End: 2024-11-07
Payer: MEDICARE

## 2024-11-07 DIAGNOSIS — K81.1 CHRONIC CHOLECYSTITIS: ICD-10-CM

## 2024-11-07 DIAGNOSIS — N17.9: Primary | ICD-10-CM

## 2024-11-07 PROBLEM — J44.9 COPD WITHOUT EXACERBATION: Status: ACTIVE | Noted: 2024-11-07

## 2024-11-07 PROBLEM — R00.1 BRADYCARDIA: Status: ACTIVE | Noted: 2024-11-07

## 2024-11-07 PROBLEM — E11.9 TYPE 2 DIABETES MELLITUS, WITHOUT LONG-TERM CURRENT USE OF INSULIN: Status: ACTIVE | Noted: 2024-11-07

## 2024-11-07 LAB
ANION GAP SERPL CALCULATED.3IONS-SCNC: 11 MMOL/L (ref 5–15)
BACTERIA UR QL AUTO: ABNORMAL /HPF
BILIRUB UR QL STRIP: NEGATIVE
BUN SERPL-MCNC: 42 MG/DL (ref 8–23)
BUN/CREAT SERPL: 18.5 (ref 7–25)
CALCIUM SPEC-SCNC: 9.1 MG/DL (ref 8.6–10.5)
CHLORIDE SERPL-SCNC: 102 MMOL/L (ref 98–107)
CLARITY UR: CLEAR
CO2 SERPL-SCNC: 20 MMOL/L (ref 22–29)
COLOR UR: YELLOW
CREAT SERPL-MCNC: 2.27 MG/DL (ref 0.57–1)
EGFRCR SERPLBLD CKD-EPI 2021: 21.6 ML/MIN/1.73
GLUCOSE BLDC GLUCOMTR-MCNC: 158 MG/DL (ref 70–130)
GLUCOSE BLDC GLUCOMTR-MCNC: 190 MG/DL (ref 70–130)
GLUCOSE BLDC GLUCOMTR-MCNC: 215 MG/DL (ref 70–130)
GLUCOSE SERPL-MCNC: 163 MG/DL (ref 65–99)
GLUCOSE UR STRIP-MCNC: ABNORMAL MG/DL
HBA1C MFR BLD: 9.6 % (ref 4.8–5.6)
HGB UR QL STRIP.AUTO: NEGATIVE
HYALINE CASTS UR QL AUTO: ABNORMAL /LPF
KETONES UR QL STRIP: NEGATIVE
LEUKOCYTE ESTERASE UR QL STRIP.AUTO: ABNORMAL
NITRITE UR QL STRIP: NEGATIVE
PH UR STRIP.AUTO: 7 [PH] (ref 5–8)
POTASSIUM SERPL-SCNC: 4.7 MMOL/L (ref 3.5–5.2)
POTASSIUM SERPL-SCNC: 5.5 MMOL/L (ref 3.5–5.2)
PROT UR QL STRIP: NEGATIVE
PTH-INTACT SERPL-MCNC: 73.3 PG/ML (ref 15–65)
QT INTERVAL: 520 MS
RBC # UR STRIP: ABNORMAL /HPF
REF LAB TEST METHOD: ABNORMAL
SODIUM SERPL-SCNC: 133 MMOL/L (ref 136–145)
SODIUM UR-SCNC: 112 MMOL/L
SP GR UR STRIP: 1.02 (ref 1–1.03)
SQUAMOUS #/AREA URNS HPF: ABNORMAL /HPF
UROBILINOGEN UR QL STRIP: ABNORMAL
WBC # UR STRIP: ABNORMAL /HPF

## 2024-11-07 PROCEDURE — 83970 ASSAY OF PARATHORMONE: CPT | Performed by: FAMILY MEDICINE

## 2024-11-07 PROCEDURE — A9270 NON-COVERED ITEM OR SERVICE: HCPCS | Performed by: FAMILY MEDICINE

## 2024-11-07 PROCEDURE — 25810000003 LACTATED RINGERS PER 1000 ML: Performed by: STUDENT IN AN ORGANIZED HEALTH CARE EDUCATION/TRAINING PROGRAM

## 2024-11-07 PROCEDURE — G0378 HOSPITAL OBSERVATION PER HR: HCPCS

## 2024-11-07 PROCEDURE — 82570 ASSAY OF URINE CREATININE: CPT | Performed by: INTERNAL MEDICINE

## 2024-11-07 PROCEDURE — 82948 REAGENT STRIP/BLOOD GLUCOSE: CPT

## 2024-11-07 PROCEDURE — 63710000001 INSULIN LISPRO (HUMAN) PER 5 UNITS: Performed by: FAMILY MEDICINE

## 2024-11-07 PROCEDURE — 84300 ASSAY OF URINE SODIUM: CPT | Performed by: INTERNAL MEDICINE

## 2024-11-07 PROCEDURE — 93005 ELECTROCARDIOGRAM TRACING: CPT

## 2024-11-07 PROCEDURE — G0463 HOSPITAL OUTPT CLINIC VISIT: HCPCS

## 2024-11-07 PROCEDURE — 84132 ASSAY OF SERUM POTASSIUM: CPT | Performed by: FAMILY MEDICINE

## 2024-11-07 PROCEDURE — 25810000003 SODIUM CHLORIDE 0.9 % SOLUTION: Performed by: FAMILY MEDICINE

## 2024-11-07 PROCEDURE — 63710000001 ACETAMINOPHEN 325 MG TABLET: Performed by: FAMILY MEDICINE

## 2024-11-07 PROCEDURE — 83036 HEMOGLOBIN GLYCOSYLATED A1C: CPT | Performed by: FAMILY MEDICINE

## 2024-11-07 PROCEDURE — 80048 BASIC METABOLIC PNL TOTAL CA: CPT | Performed by: STUDENT IN AN ORGANIZED HEALTH CARE EDUCATION/TRAINING PROGRAM

## 2024-11-07 PROCEDURE — 81001 URINALYSIS AUTO W/SCOPE: CPT | Performed by: INTERNAL MEDICINE

## 2024-11-07 RX ORDER — DEXTROSE MONOHYDRATE 25 G/50ML
25 INJECTION, SOLUTION INTRAVENOUS
Status: DISCONTINUED | OUTPATIENT
Start: 2024-11-07 | End: 2024-11-09 | Stop reason: HOSPADM

## 2024-11-07 RX ORDER — IPRATROPIUM BROMIDE AND ALBUTEROL SULFATE 2.5; .5 MG/3ML; MG/3ML
3 SOLUTION RESPIRATORY (INHALATION) EVERY 6 HOURS PRN
Status: DISCONTINUED | OUTPATIENT
Start: 2024-11-07 | End: 2024-11-09 | Stop reason: HOSPADM

## 2024-11-07 RX ORDER — SODIUM CHLORIDE 0.9 % (FLUSH) 0.9 %
10 SYRINGE (ML) INJECTION AS NEEDED
Status: DISCONTINUED | OUTPATIENT
Start: 2024-11-07 | End: 2024-11-09 | Stop reason: HOSPADM

## 2024-11-07 RX ORDER — NICOTINE POLACRILEX 4 MG
15 LOZENGE BUCCAL
Status: DISCONTINUED | OUTPATIENT
Start: 2024-11-07 | End: 2024-11-09 | Stop reason: HOSPADM

## 2024-11-07 RX ORDER — SODIUM CHLORIDE 0.9 % (FLUSH) 0.9 %
10 SYRINGE (ML) INJECTION EVERY 12 HOURS SCHEDULED
Status: DISCONTINUED | OUTPATIENT
Start: 2024-11-07 | End: 2024-11-09 | Stop reason: HOSPADM

## 2024-11-07 RX ORDER — SODIUM CHLORIDE 9 MG/ML
40 INJECTION, SOLUTION INTRAVENOUS AS NEEDED
Status: DISCONTINUED | OUTPATIENT
Start: 2024-11-07 | End: 2024-11-09 | Stop reason: HOSPADM

## 2024-11-07 RX ORDER — LEVOTHYROXINE SODIUM 50 UG/1
50 TABLET ORAL DAILY
Status: DISCONTINUED | OUTPATIENT
Start: 2024-11-07 | End: 2024-11-09 | Stop reason: HOSPADM

## 2024-11-07 RX ORDER — HUMAN INSULIN 100 [IU]/ML
10 INJECTION, SUSPENSION SUBCUTANEOUS NIGHTLY
COMMUNITY

## 2024-11-07 RX ORDER — ACETAMINOPHEN 160 MG/5ML
650 SOLUTION ORAL EVERY 4 HOURS PRN
Status: DISCONTINUED | OUTPATIENT
Start: 2024-11-07 | End: 2024-11-09 | Stop reason: HOSPADM

## 2024-11-07 RX ORDER — AMOXICILLIN 250 MG
2 CAPSULE ORAL 2 TIMES DAILY PRN
Status: DISCONTINUED | OUTPATIENT
Start: 2024-11-07 | End: 2024-11-09 | Stop reason: HOSPADM

## 2024-11-07 RX ORDER — ATORVASTATIN CALCIUM 40 MG/1
80 TABLET, FILM COATED ORAL DAILY
Status: DISCONTINUED | OUTPATIENT
Start: 2024-11-07 | End: 2024-11-09 | Stop reason: HOSPADM

## 2024-11-07 RX ORDER — BISACODYL 10 MG
10 SUPPOSITORY, RECTAL RECTAL DAILY PRN
Status: DISCONTINUED | OUTPATIENT
Start: 2024-11-07 | End: 2024-11-09 | Stop reason: HOSPADM

## 2024-11-07 RX ORDER — SODIUM CHLORIDE 0.9 % (FLUSH) 0.9 %
3 SYRINGE (ML) INJECTION AS NEEDED
Status: DISCONTINUED | OUTPATIENT
Start: 2024-11-07 | End: 2024-11-07 | Stop reason: HOSPADM

## 2024-11-07 RX ORDER — ONDANSETRON 4 MG/1
4 TABLET, ORALLY DISINTEGRATING ORAL EVERY 6 HOURS PRN
Status: DISCONTINUED | OUTPATIENT
Start: 2024-11-07 | End: 2024-11-09 | Stop reason: HOSPADM

## 2024-11-07 RX ORDER — POLYETHYLENE GLYCOL 3350 17 G/17G
17 POWDER, FOR SOLUTION ORAL DAILY PRN
Status: DISCONTINUED | OUTPATIENT
Start: 2024-11-07 | End: 2024-11-09 | Stop reason: HOSPADM

## 2024-11-07 RX ORDER — ONDANSETRON 2 MG/ML
4 INJECTION INTRAMUSCULAR; INTRAVENOUS EVERY 6 HOURS PRN
Status: DISCONTINUED | OUTPATIENT
Start: 2024-11-07 | End: 2024-11-09 | Stop reason: HOSPADM

## 2024-11-07 RX ORDER — SODIUM CHLORIDE 9 MG/ML
100 INJECTION, SOLUTION INTRAVENOUS CONTINUOUS
Status: DISCONTINUED | OUTPATIENT
Start: 2024-11-07 | End: 2024-11-07

## 2024-11-07 RX ORDER — SODIUM CHLORIDE, SODIUM LACTATE, POTASSIUM CHLORIDE, CALCIUM CHLORIDE 600; 310; 30; 20 MG/100ML; MG/100ML; MG/100ML; MG/100ML
1000 INJECTION, SOLUTION INTRAVENOUS CONTINUOUS
Status: DISCONTINUED | OUTPATIENT
Start: 2024-11-07 | End: 2024-11-07

## 2024-11-07 RX ORDER — ACETAMINOPHEN 325 MG/1
650 TABLET ORAL EVERY 4 HOURS PRN
Status: DISCONTINUED | OUTPATIENT
Start: 2024-11-07 | End: 2024-11-09 | Stop reason: HOSPADM

## 2024-11-07 RX ORDER — IBUPROFEN 600 MG/1
1 TABLET ORAL
Status: DISCONTINUED | OUTPATIENT
Start: 2024-11-07 | End: 2024-11-09 | Stop reason: HOSPADM

## 2024-11-07 RX ORDER — INDOCYANINE GREEN AND WATER 25 MG
3.75 KIT INJECTION ONCE
Status: DISCONTINUED | OUTPATIENT
Start: 2024-11-07 | End: 2024-11-07 | Stop reason: HOSPADM

## 2024-11-07 RX ORDER — HUMAN INSULIN 100 [USP'U]/ML
10 INJECTION, SUSPENSION SUBCUTANEOUS EVERY MORNING
Status: ON HOLD | COMMUNITY
End: 2024-11-07

## 2024-11-07 RX ORDER — ENOXAPARIN SODIUM 100 MG/ML
30 INJECTION SUBCUTANEOUS DAILY
Status: DISCONTINUED | OUTPATIENT
Start: 2024-11-07 | End: 2024-11-07 | Stop reason: HOSPADM

## 2024-11-07 RX ORDER — BISACODYL 5 MG/1
5 TABLET, DELAYED RELEASE ORAL DAILY PRN
Status: DISCONTINUED | OUTPATIENT
Start: 2024-11-07 | End: 2024-11-09 | Stop reason: HOSPADM

## 2024-11-07 RX ORDER — GLIPIZIDE 10 MG/1
10 TABLET, FILM COATED, EXTENDED RELEASE ORAL DAILY
COMMUNITY

## 2024-11-07 RX ORDER — INSULIN LISPRO 100 [IU]/ML
2-7 INJECTION, SOLUTION INTRAVENOUS; SUBCUTANEOUS
Status: DISCONTINUED | OUTPATIENT
Start: 2024-11-07 | End: 2024-11-09 | Stop reason: HOSPADM

## 2024-11-07 RX ADMIN — ACETAMINOPHEN 650 MG: 325 TABLET, FILM COATED ORAL at 23:11

## 2024-11-07 RX ADMIN — SODIUM CHLORIDE, POTASSIUM CHLORIDE, SODIUM LACTATE AND CALCIUM CHLORIDE 1000 ML: 600; 310; 30; 20 INJECTION, SOLUTION INTRAVENOUS at 09:13

## 2024-11-07 RX ADMIN — Medication 10 ML: at 20:39

## 2024-11-07 RX ADMIN — INSULIN LISPRO 2 UNITS: 100 INJECTION, SOLUTION INTRAVENOUS; SUBCUTANEOUS at 17:55

## 2024-11-07 RX ADMIN — SODIUM BICARBONATE 100 MEQ: 84 INJECTION, SOLUTION INTRAVENOUS at 19:56

## 2024-11-07 RX ADMIN — SODIUM CHLORIDE 100 ML/HR: 9 INJECTION, SOLUTION INTRAVENOUS at 14:19

## 2024-11-07 RX ADMIN — INSULIN LISPRO 3 UNITS: 100 INJECTION, SOLUTION INTRAVENOUS; SUBCUTANEOUS at 20:39

## 2024-11-07 NOTE — ANESTHESIA PREPROCEDURE EVALUATION
Anesthesia Evaluation     Patient summary reviewed   no history of anesthetic complications:   NPO Solid Status: > 8 hours  NPO Liquid Status: > 8 hours           Airway   Dental      Pulmonary    (+) a smoker Former, COPD,home oxygen (prn)  (-) asthma, sleep apnea  Cardiovascular     (+) past MI , CAD (s/p angioplasty at 50 years of age, mid RCA ), dysrhythmias Atrial Fib, CHF     ROS comment: Followed by Dr. Álvarez, last heart cath 12/23    Neuro/Psych  (-) seizures, TIA, CVA  GI/Hepatic/Renal/Endo    (+) renal disease- ARF, diabetes mellitus, thyroid problem   (-) liver disease    Musculoskeletal     Abdominal    Substance History      OB/GYN          Other                      Anesthesia Plan      (Case cancelled in preop- Patient with acute unexplained renal failure in setting of very mild CKD. 12/23 review of baseline cr was 1.0 and on 10/31 was 2.7 with hyperkalemia to 5.7, repeat showed mild improvement to Cr. Of 2.27 with a potassium of 5.5. I discussed my concerns of unexplained renal failure with patient and Dr. Hutson and my concerns regarding elevated risk of worsened kidney function or cardiac complication under anesthesia care. We both felt strongly that patient required workup for etiology of ARF prior to proceeding with lap burt. Dr. Hutson to discuss with hospitalist regarding admission. Defer rest of care to Dr. Hutson team)      CODE STATUS:

## 2024-11-07 NOTE — H&P
[facility] Medicine Services  HISTORY AND PHYSICAL    Date of Admission: 11/7/2024  Primary Care Physician: Germaine Richard MD    Subjective   Primary Historian: Patient    Chief Complaint: Abnormal lab values and preop for cholecystectomy    History of Present Illness  Kajal Huston is a 79-year-old female with a past medical history of atrial fibrillation, congestive heart failure, most recent echocardiogram performed December 2023 with an EF of 20-25%, patient states she had a recent echocardiogram performed at [facility], [facility] to review which she states her EF had improved to 50-55%.  COPD, diabetes type 2, macular degeneration, melanoma and thyroid disorder.  Patient was in preop today for scheduled cholecystectomy due to chronic cholecystitis per Dr. Paulina Hutson, general surgery.  Patient has had significant abdominal pain over the last few weeks, Dr. Hutson evaluated the patient and given the nature, timing and duration are most consistent with biliary colic or Haresh will perform procedure to evaluate for resolution with recommendations for GI consult and EGD if no significant improvement.  Patient was being worked up in preop this morning when it was discovered that she creatinine was elevated at 2.27, which was an improvement from her creatinine 10/31/2024 of 2.73.  Baseline appears to be approximately 1.1, which places patient in stage II acute kidney injury.    Upon assessment patient states that she knows her creatinine had been mildly elevated at the beginning of the year as her PCP took her off of her Lasix for a few weeks.  Patient additionally states that she was told her kidney function was mildly elevated after she initiated Farxiga post AMI.  Patient additionally states that her bradycardia is chronic and patient has been told to continue her metoprolol tartrate 50 mg daily as long as her heart rate remains greater than 45-50.  Patient was made  aware that preoperatively we will hold and evaluate for improvement preoperatively.  Patient and daughter were made aware that patient will be evaluated by nephrology, renal ultrasound, holding of all nephrotoxic agents, and will reevaluate in a.m. with Dr. Hutson for possible operative procedure.  All patient and daughter's questions were answered to the best my ability and they are in agreement for current plan of care.      Review of Systems   Constitutional:  Positive for fatigue. Negative for fever.   Respiratory:  Negative for shortness of breath and wheezing.    Cardiovascular:  Negative for chest pain.   Gastrointestinal:  Positive for abdominal pain and nausea. Negative for abdominal distention, diarrhea and vomiting.   Genitourinary:  Negative for difficulty urinating.   Neurological:  Positive for weakness. Negative for dizziness and headaches.      Otherwise complete ROS reviewed and negative except as mentioned in the HPI.    Past Medical History:   Past Medical History:   Diagnosis Date    Atrial fibrillation     CHF (congestive heart failure)     COPD (chronic obstructive pulmonary disease)     Diabetes     Heart attack     x 3    Macular degeneration     Melanoma     Thyroid disorder      Past Surgical History:  Past Surgical History:   Procedure Laterality Date    CARDIAC CATHETERIZATION Bilateral 06/22/2020    Procedure: Coronary angiography;  Surgeon: Chao Álvarez MD;  Location:  [facility] CATH [facility]VAS[facility];  Service: Cardiology;  Laterality: Bilateral;    CARDIAC CATHETERIZATION N/A 06/22/2020    Procedure: Left ventriculography;  Surgeon: Chao Álvarez MD;  Location:  [facility] CATH [facility]VAS[facility];  Service: Cardiology;  Laterality: N/A;    CARDIAC CATHETERIZATION N/A 06/22/2020    Procedure: Left Heart Cath;  Surgeon: Chao Álvarez MD;  Location:  [facility] CATH INVAS[facility];  Service: Cardiology;  Laterality: N/A;    CARDIAC CATHETERIZATION      x 4    CORONARY ANGIOPLASTY       HYSTERECTOMY      TONSILLECTOMY       Social History:  reports that she quit smoking about 21 years ago. Her smoking use included cigarettes. She has been exposed to tobacco smoke. She has never used smokeless tobacco. She reports that she does not drink alcohol and does not use drugs.    Family History: family history includes Diabetes in her mother.     Allergies:  No Known Allergies    Medications:  Prior to Admission medications    Medication Sig Start Date End Date Taking? Authorizing Provider   amiodarone (PACERONE) 200 MG tablet Take 0.5 tablets by mouth Daily.   Yes Teo Candelario MD   aspirin 81 MG EC tablet Take 1 tablet by mouth Daily.   Yes Teo Candelario MD   atorvastatin (LIPITOR) 80 MG tablet Take 1 tablet by mouth Daily.   Yes Teo Candelario MD   Cholecalciferol (VITAMIN D3 PO) Take 1 tablet by mouth Daily.   Yes Teo Candelario MD   dapagliflozin Propanediol (Farxiga) 10 MG tablet Take  by mouth Daily.   Yes Teo Candelario MD   furosemide (LASIX) 20 MG tablet Take 1 tablet by mouth Daily. 1/8/18  Yes Teo Candelario MD   glipizide (GLUCOTROL) 10 MG tablet Take 1 tablet by mouth Daily.   Yes Teo Candelario MD   levothyroxine (SYNTHROID, LEVOTHROID) 50 MCG tablet Take 1 tablet by mouth Daily. 8/8/16  Yes Teo Candelario MD   losartan (COZAAR) 50 MG tablet Take 1 tablet by mouth Daily.   Yes Teo Candelario MD   metFORMIN (GLUCOPHAGE) 500 MG tablet Take 2 tablets by mouth Every Night.   Yes Teo Candelario MD   metoprolol succinate XL (TOPROL-XL) 50 MG 24 hr tablet Take 1 tablet by mouth Daily.   Yes Teo Candelario MD   NOVOLIN 70/30 RELION (70-30) 100 UNIT/ML injection Inject 35 Units under the skin into the appropriate area as directed Daily. 10/17/16  Yes Teo Candelario MD   spironolactone (ALDACTONE) 25 MG tablet Take 1 tablet by mouth Daily.   Yes Teo Candelario MD   glipiZIDE (GLUCOTROL) 5 MG ER tablet Take 5 mg  by mouth Daily.  Patient taking differently: Take 2 tablets by mouth Daily. 10/14/16 11/7/24 Yes Teo Candelario MD   metFORMIN XR (GLUCOPHAGE-XR) 500 MG 24 hr tablet Take 2 tablets by mouth Every Night. 10/15/16 11/7/24 Yes Teo Candelario MD   apixaban (ELIQUIS) 2.5 MG tablet tablet Take 1 tablet by mouth 2 (Two) Times a Day.    Teo Candelario MD   ipratropium (ATROVENT HFA) 17 MCG/ACT inhaler Inhale 2 puffs 4 (Four) Times a Day.  Patient not taking: Reported on 10/31/2024    Teo Candelario MD   ipratropium-albuterol (COMBIVENT RESPIMAT)  MCG/ACT inhaler Inhale 1 puff 4 (Four) Times a Day As Needed for Wheezing.    Teo Candelario MD   Multiple Vitamins-Minerals (MULTIVITAMIN WITH MINERALS) tablet tablet Take 1 tablet by mouth Daily.    Teo Candelario MD   O2 (OXYGEN) Inhale 2 L/min As Needed (soa).    Teo Candelario MD   metoprolol tartrate (LOPRESSOR) 50 MG tablet Take 1 tablet by mouth Daily.  Patient not taking: Reported on 10/31/2024  11/7/24  Teo Candelario MD     I have utilized all available immediate resources to obtain, update, or review the patient's current medications (including all prescriptions, over-the-counter products, herbals, cannabis/cannabidiol products, and vitamin/mineral/dietary (nutritional) supplements).    Objective     Vital Signs: /43 (BP Location: Right arm, Patient Position: Lying)   Pulse 59   Temp 97.7 °F (36.5 °C) (Oral)   Resp 16   SpO2 97%   Physical Exam  Vitals and nursing note reviewed.   Constitutional:       Appearance: Normal appearance.   HENT:      Head: Normocephalic and atraumatic.      Right Ear: Tympanic membrane normal.      Left Ear: Tympanic membrane normal.      Mouth/Throat:      Mouth: Mucous membranes are moist.      Pharynx: Oropharynx is clear.   Eyes:      Extraocular Movements: Extraocular movements intact.      Pupils: Pupils are equal, round, and reactive to light.   Cardiovascular:       Rate and Rhythm: Regular rhythm. Bradycardia present.      Pulses: Normal pulses.      Heart sounds: Normal heart sounds.   Pulmonary:      Effort: Pulmonary effort is normal.      Breath sounds: Normal breath sounds.   Abdominal:      General: Abdomen is flat. Bowel sounds are normal. There is no distension.      Palpations: Abdomen is soft.      Comments: Significant intermittent abdominal cramping    Negative tenderness to palpation in all quadrants   Genitourinary:     Comments: Voiding per bathroom privileges  Musculoskeletal:         General: Normal range of motion.      Cervical back: Normal range of motion and neck supple.      Right lower leg: No edema.      Left lower leg: No edema.   Skin:     General: Skin is warm and dry.   Neurological:      General: No focal deficit present.      Mental Status: She is alert and oriented to person, place, and time.   Psychiatric:         Mood and Affect: Mood normal.         Behavior: Behavior normal.         Thought Content: Thought content normal.         Judgment: Judgment normal.          Results Reviewed:  Lab Results (last 24 hours)       Procedure Component Value Units Date/Time    Basic Metabolic Panel [[id number]]  (Abnormal) Collected: 11/07/24 0947    Specimen: Blood Updated: 11/07/24 1020     Glucose 163 mg/dL      BUN 42 mg/dL      Creatinine 2.27 mg/dL      Sodium 133 mmol/L      Potassium 5.5 mmol/L      Comment: Slight hemolysis detected by analyzer. Result may be falsely elevated.        Chloride 102 mmol/L      CO2 20.0 mmol/L      Calcium 9.1 mg/dL      BUN/Creatinine Ratio 18.5     Anion Gap 11.0 mmol/L      eGFR 21.6 mL/min/1.73     Narrative:      GFR Normal >60  Chronic Kidney Disease <60  Kidney Failure <15    The GFR formula is only valid for adults with stable renal function between ages 18 and 70.    POC Glucose Once [[id number]]  (Abnormal) Collected: 11/07/24 0914    Specimen: Blood Updated: 11/07/24 0925     Glucose 158 mg/dL      Comment:  : [id number] Chantal Lu (Price)Meter ID: [id number]             Imaging Results (Last 24 Hours)       ** No results found for the last 24 hours. **            Assessment / Plan   Assessment:   Active Hospital Problems    Diagnosis     **Stage 2 acute kidney injury     Type 2 diabetes mellitus, without long-term current use of insulin     COPD without exacerbation     Chronic sinus bradycardia     Chronic cholecystitis        Treatment Plan  The patient will be admitted to Dr. Wade service here at [facility].     1.  Stage II acute kidney injury-baseline creatinine of 1.0, currently creatinine of 2.27 which is improved since preoperative results on 10/31/2024 with a creatinine of 2.73.  Normal saline 100 mL/h for the next 12 hours, follow urine output, monitor potassium level and renal function.  Renal ultrasound pending.  Hold diuretics, discontinue losartan.  Nephrology consultation    2.  Chronic cholecystitis-Dr. Hutson will continue to follow for surgical appropriateness as patient improves.  Continue to hold Eliquis.  N.p.o. after midnight, Levsin as needed for abdominal cramping    3.  Diabetes mellitus type 2-patient may remain n.p.o. after midnight, will continue low-dose SSI with Accu-Cheks before meals and at bedtime.  Continue to hold sulfonylurea and metformin.    4.  COPD without exacerbation-signs or symptoms of exacerbation, will continue supplemental oxygenation as needed.  No signs or symptoms of exacerbation, will continue supplemental oxygenation as needed.    5.  Chronic bradycardia-patient's baseline heart rate is approximately 45-50, her cardiologist is aware and was told to continue metoprolol tartrate.  Will hold metoprolol tartrate in the a.m. for possible surgery.  Cardiac monitoring, EKG in the a.m.    VTE prophylaxis with SCDs given home Eliquis will be remain on hold.  Labs in a.m.  Medical Decision Making  Stage I I WILFRID, acute on chronic, moderate complexity,  unchanged  Chronic cholecystitis, chronic, moderate complexity, unchanged  Diabetes mellitus type 2, chronic, moderate complexity, stable  COPD without exacerbation, chronic, moderate complexity, stable  Chronic bradycardia, chronic, moderate complexity, stable  Number and Complexity of problems: 5  Differential Diagnosis: None    Conditions and Status        Condition is unchanged.     [facility] Data  External documents reviewed: Care everywhere review of previous PCP and cardiology a  Cardiac tracing (EKG, telemetry) interpretation: 11/7/2024 EKG per cardiology reviewed  Radiology interpretation: None  Labs reviewed: 10/31/2024 CMP, CBC, 11/7/2024 BMP, reviewed labs in a.m.  Any tests that were considered but not ordered: None     Decision rules/scores evaluated (example [id number], Wells, etc): None     Discussed with: Dr. Wade, her daughter Shanique and patient     Care Planning  Shared decision making: Dr. Wade, her daughter Shanique and patient  Code status and discussions: Full code per patient    Disposition  Social Determinants of Health that impact treatment or disposition: None determined at this time  Estimated length of stay is 2-3 days.     I confirmed that the patient's advanced care plan is present, code status is documented, and a surrogate decision maker is listed in the patient's medical record.     The patient's surrogate decision maker is daughter Shanique.     The patient was seen and examined by me on 1/7/2024 at 1401.    Electronically signed by TABITHA Rodriguez, 11/07/24, 14:01 CST.

## 2024-11-07 NOTE — NURSING NOTE
at the bedside and she discusses with the patient the recent abnormal kidney function , she verbalizes the risk of surgery with her abnormal labs . She will discuss with  prior to proceeding with surgery today since her scheduled surgery is elective

## 2024-11-07 NOTE — PLAN OF CARE
Goal Outcome Evaluation:  Plan of Care Reviewed With: patient, family           Outcome Evaluation: A&O. Room air. IVF. Voiding. Up with SBA. Tele; bradycardic. Accuchecks. Will be NPO at midnight for renal US. Safety maintained.

## 2024-11-07 NOTE — NURSING NOTE
Report called to Evelyn WEBB on 3C nurses station . Vital signs have remained stable and patient is asymptomatic of any shortness of breath or chest pain. Heart rate is noted to sinus bradycardia with her rate rating from 40-52. Daughter attentive at the bedside

## 2024-11-07 NOTE — PLAN OF CARE
"Goal Outcome Evaluation:               PT orders received     Pt stated that she did not want therapy. Pt stated \" I just got here, I just came back from the bathroom, I am fine\". Pt stated that she is at her baseline physically. PT to sign off. Please re consult if status changes.                              "

## 2024-11-08 ENCOUNTER — APPOINTMENT (OUTPATIENT)
Dept: ULTRASOUND IMAGING | Facility: HOSPITAL | Age: 79
End: 2024-11-08
Payer: MEDICARE

## 2024-11-08 LAB
ALBUMIN SERPL-MCNC: 3.4 G/DL (ref 3.5–5.2)
ALBUMIN/GLOB SERPL: 1.4 G/DL
ALP SERPL-CCNC: 82 U/L (ref 39–117)
ALT SERPL W P-5'-P-CCNC: 17 U/L (ref 1–33)
ANION GAP SERPL CALCULATED.3IONS-SCNC: 9 MMOL/L (ref 5–15)
AST SERPL-CCNC: 18 U/L (ref 1–32)
BASOPHILS # BLD AUTO: 0.1 10*3/MM3 (ref 0–0.2)
BASOPHILS NFR BLD AUTO: 1.2 % (ref 0–1.5)
BILIRUB SERPL-MCNC: 0.3 MG/DL (ref 0–1.2)
BUN SERPL-MCNC: 31 MG/DL (ref 8–23)
BUN/CREAT SERPL: 16.9 (ref 7–25)
CALCIUM SPEC-SCNC: 8.6 MG/DL (ref 8.6–10.5)
CHLORIDE SERPL-SCNC: 104 MMOL/L (ref 98–107)
CO2 SERPL-SCNC: 24 MMOL/L (ref 22–29)
CREAT SERPL-MCNC: 1.83 MG/DL (ref 0.57–1)
CREAT UR-MCNC: 36.4 MG/DL
DEPRECATED RDW RBC AUTO: 58.5 FL (ref 37–54)
EGFRCR SERPLBLD CKD-EPI 2021: 28 ML/MIN/1.73
EOSINOPHIL # BLD AUTO: 0.3 10*3/MM3 (ref 0–0.4)
EOSINOPHIL NFR BLD AUTO: 3.6 % (ref 0.3–6.2)
ERYTHROCYTE [DISTWIDTH] IN BLOOD BY AUTOMATED COUNT: 19.3 % (ref 12.3–15.4)
GLOBULIN UR ELPH-MCNC: 2.5 GM/DL
GLUCOSE BLDC GLUCOMTR-MCNC: 147 MG/DL (ref 70–130)
GLUCOSE BLDC GLUCOMTR-MCNC: 179 MG/DL (ref 70–130)
GLUCOSE BLDC GLUCOMTR-MCNC: 239 MG/DL (ref 70–130)
GLUCOSE BLDC GLUCOMTR-MCNC: 255 MG/DL (ref 70–130)
GLUCOSE SERPL-MCNC: 148 MG/DL (ref 65–99)
HCT VFR BLD AUTO: 36 % (ref 34–46.6)
HGB BLD-MCNC: 10.9 G/DL (ref 12–15.9)
IMM GRANULOCYTES # BLD AUTO: 0.03 10*3/MM3 (ref 0–0.05)
IMM GRANULOCYTES NFR BLD AUTO: 0.4 % (ref 0–0.5)
LYMPHOCYTES # BLD AUTO: 2.23 10*3/MM3 (ref 0.7–3.1)
LYMPHOCYTES NFR BLD AUTO: 26.8 % (ref 19.6–45.3)
MCH RBC QN AUTO: 25 PG (ref 26.6–33)
MCHC RBC AUTO-ENTMCNC: 30.3 G/DL (ref 31.5–35.7)
MCV RBC AUTO: 82.6 FL (ref 79–97)
MONOCYTES # BLD AUTO: 1.03 10*3/MM3 (ref 0.1–0.9)
MONOCYTES NFR BLD AUTO: 12.4 % (ref 5–12)
NEUTROPHILS NFR BLD AUTO: 4.63 10*3/MM3 (ref 1.7–7)
NEUTROPHILS NFR BLD AUTO: 55.6 % (ref 42.7–76)
NRBC BLD AUTO-RTO: 0 /100 WBC (ref 0–0.2)
PHOSPHATE SERPL-MCNC: 3.5 MG/DL (ref 2.5–4.5)
PLATELET # BLD AUTO: 313 10*3/MM3 (ref 140–450)
PMV BLD AUTO: 11.6 FL (ref 6–12)
POTASSIUM SERPL-SCNC: 4.4 MMOL/L (ref 3.5–5.2)
PROT SERPL-MCNC: 5.9 G/DL (ref 6–8.5)
RBC # BLD AUTO: 4.36 10*6/MM3 (ref 3.77–5.28)
SODIUM SERPL-SCNC: 137 MMOL/L (ref 136–145)
WBC NRBC COR # BLD AUTO: 8.32 10*3/MM3 (ref 3.4–10.8)

## 2024-11-08 PROCEDURE — A9270 NON-COVERED ITEM OR SERVICE: HCPCS

## 2024-11-08 PROCEDURE — 63710000001 METOPROLOL SUCCINATE XL 50 MG TABLET SUSTAINED-RELEASE 24 HOUR

## 2024-11-08 PROCEDURE — G0378 HOSPITAL OBSERVATION PER HR: HCPCS

## 2024-11-08 PROCEDURE — 25810000003 SODIUM CHLORIDE 0.9 % SOLUTION: Performed by: INTERNAL MEDICINE

## 2024-11-08 PROCEDURE — 82948 REAGENT STRIP/BLOOD GLUCOSE: CPT

## 2024-11-08 PROCEDURE — 63710000001 INSULIN LISPRO (HUMAN) PER 5 UNITS: Performed by: FAMILY MEDICINE

## 2024-11-08 PROCEDURE — 63710000001 HYOSCYAMINE 0.125 MG SUBLINGUAL TABLET

## 2024-11-08 PROCEDURE — 63710000001 ASPIRIN 81 MG TABLET DELAYED-RELEASE

## 2024-11-08 PROCEDURE — 63710000001 AMIODARONE 200 MG TABLET

## 2024-11-08 PROCEDURE — 80053 COMPREHEN METABOLIC PANEL: CPT | Performed by: FAMILY MEDICINE

## 2024-11-08 PROCEDURE — 85025 COMPLETE CBC W/AUTO DIFF WBC: CPT | Performed by: FAMILY MEDICINE

## 2024-11-08 PROCEDURE — 63710000001 ACETAMINOPHEN 325 MG TABLET: Performed by: FAMILY MEDICINE

## 2024-11-08 PROCEDURE — A9270 NON-COVERED ITEM OR SERVICE: HCPCS | Performed by: FAMILY MEDICINE

## 2024-11-08 PROCEDURE — 63710000001 APIXABAN 2.5 MG TABLET

## 2024-11-08 PROCEDURE — 84100 ASSAY OF PHOSPHORUS: CPT | Performed by: FAMILY MEDICINE

## 2024-11-08 PROCEDURE — 76775 US EXAM ABDO BACK WALL LIM: CPT

## 2024-11-08 RX ORDER — ASPIRIN 81 MG/1
81 TABLET ORAL DAILY
Status: DISCONTINUED | OUTPATIENT
Start: 2024-11-08 | End: 2024-11-09 | Stop reason: HOSPADM

## 2024-11-08 RX ORDER — AMIODARONE HYDROCHLORIDE 200 MG/1
100 TABLET ORAL DAILY
Status: DISCONTINUED | OUTPATIENT
Start: 2024-11-08 | End: 2024-11-09 | Stop reason: HOSPADM

## 2024-11-08 RX ORDER — METOPROLOL SUCCINATE 50 MG/1
50 TABLET, EXTENDED RELEASE ORAL
Status: DISCONTINUED | OUTPATIENT
Start: 2024-11-08 | End: 2024-11-09 | Stop reason: HOSPADM

## 2024-11-08 RX ORDER — SODIUM CHLORIDE 9 MG/ML
75 INJECTION, SOLUTION INTRAVENOUS CONTINUOUS
Status: DISCONTINUED | OUTPATIENT
Start: 2024-11-08 | End: 2024-11-09 | Stop reason: HOSPADM

## 2024-11-08 RX ADMIN — Medication 10 ML: at 21:00

## 2024-11-08 RX ADMIN — ACETAMINOPHEN 650 MG: 325 TABLET, FILM COATED ORAL at 13:23

## 2024-11-08 RX ADMIN — Medication 10 ML: at 08:43

## 2024-11-08 RX ADMIN — INSULIN LISPRO 4 UNITS: 100 INJECTION, SOLUTION INTRAVENOUS; SUBCUTANEOUS at 11:39

## 2024-11-08 RX ADMIN — ASPIRIN 81 MG: 81 TABLET, COATED ORAL at 11:12

## 2024-11-08 RX ADMIN — AMIODARONE HYDROCHLORIDE 100 MG: 200 TABLET ORAL at 11:15

## 2024-11-08 RX ADMIN — HYOSCYAMINE SULFATE 250 MCG: 0.12 TABLET SUBLINGUAL at 09:55

## 2024-11-08 RX ADMIN — SODIUM BICARBONATE 100 MEQ: 84 INJECTION, SOLUTION INTRAVENOUS at 07:15

## 2024-11-08 RX ADMIN — INSULIN LISPRO 3 UNITS: 100 INJECTION, SOLUTION INTRAVENOUS; SUBCUTANEOUS at 17:17

## 2024-11-08 RX ADMIN — ACETAMINOPHEN 650 MG: 325 TABLET, FILM COATED ORAL at 21:09

## 2024-11-08 RX ADMIN — SODIUM CHLORIDE 75 ML/HR: 9 INJECTION, SOLUTION INTRAVENOUS at 15:59

## 2024-11-08 RX ADMIN — APIXABAN 2.5 MG: 2.5 TABLET, FILM COATED ORAL at 11:30

## 2024-11-08 RX ADMIN — METOPROLOL SUCCINATE 50 MG: 50 TABLET, EXTENDED RELEASE ORAL at 11:13

## 2024-11-08 RX ADMIN — APIXABAN 2.5 MG: 2.5 TABLET, FILM COATED ORAL at 21:00

## 2024-11-08 NOTE — PROGRESS NOTES
Nephrology ([facility]) Progress Note      Patient:  Kajal Huston  YOB: 1945  Date of Service: 11/8/2024  MRN: [id number]   Acct: [id number]   Primary Care Physician: Germaine Richard MD  Advance Directive:   Code Status and Medical Interventions: CPR (Attempt to Resuscitate); Full Support   Ordered at: 11/08/24 0947     Level Of Support Discussed With:    Patient     Code Status (Patient has no pulse and is not breathing):    CPR (Attempt to Resuscitate)     Medical Interventions (Patient has pulse or is breathing):    Full Support     Admit Date: 11/7/2024       Hospital Day: 0  Referring Provider: Mercedes Hutson MD      Patient Seen, Chart, Consults, Notes, Labs, Radiology studies reviewed.      Subjective:  Kajal Huston is a 79 y.o. female  whom we were consulted for acute kidney injury.  Patient has type 2 diabetes and has been on fark [employer].  She also has history of coronary artery disease, CHF, atrial fibrillation on Eliquis and hypertension.  She has been dealing with GI symptoms over the last 2 months with intermittent nausea and vomiting with right uadrant abdominal pain.  She was found with abnormal HIDA scan of the gallbladder on October 10.  Patient was seen by general surgery but was subsequently admitted on November 7 for elective cholecystectomy.  Back on October 31, patient was found with serum creatinine of 2.7 with GFR at 17.  She is unaware of underlying CKD and has not seen nephrology in the past.  On November 7 her serum creatinine has improved some and is down to 2.2.  Patient has no dysuria.  She denies NSAIDs abuse.  Her A1c was 9.6%.  She denies a change to her urine habits.  She was started on IV fluids.  During this stay, she was found bradycardic and her Toprol has been stopped.    Today, patient is feeling better.  Her serum creatinine has improved to 1.8.    Allergies:  Patient has no known allergies.    Home Meds:  Medications  Prior to Admission   Medication Sig Dispense Refill Last Dose/Taking    amiodarone (PACERONE) 100 MG tablet Take 1 tablet by mouth Daily.   11/6/2024 at  9:00 AM    apixaban (ELIQUIS) 5 MG tablet tablet Take 1 tablet by mouth 2 (Two) Times a Day.   Taking    aspirin 81 MG EC tablet Take 1 tablet by mouth Daily.   11/6/2024 at  9:00 AM    atorvastatin (LIPITOR) 80 MG tablet Take 1 tablet by mouth Daily.   11/6/2024 at  9:00 PM    cholecalciferol (VITAMIN D3) 25 MCG (1000 UT) tablet Take 1 tablet by mouth Daily.   11/6/2024 at  9:00 AM    dapagliflozin Propanediol (Farxiga) 10 MG tablet Take 10 mg by mouth Daily.   11/6/2024 at  9:00 AM    furosemide (LASIX) 20 MG tablet Take 1 tablet by mouth Daily.   11/6/2024 at  9:00 AM    levothyroxine (SYNTHROID, LEVOTHROID) 112 MCG tablet Take 0.5 tablets by mouth Daily.   11/6/2024    losartan (COZAAR) 50 MG tablet Take 1 tablet by mouth Daily.   11/5/2024 at  9:00 AM    metFORMIN (GLUCOPHAGE) 500 MG tablet Take 2 tablets by mouth 2 (Two) Times a Day With Meals. Patient taking 2 tablets every night (Patient taking differently: Take 2 tablets by mouth 2 (Two) Times a Day With Meals. Patient taking 2 tablets every night.)   Patient Taking Differently    metoprolol succinate XL (TOPROL-XL) 50 MG 24 hr tablet Take 1 tablet by mouth Daily.   11/7/2024 at  6:30 AM    Multiple Vitamins-Minerals (MULTIVITAMIN WITH MINERALS) tablet tablet Take 1 tablet by mouth Daily.   Taking    NOVOLIN 70/30 RELION (70-30) 100 UNIT/ML injection Inject 30 Units under the skin into the appropriate area as directed Daily.   11/6/2024 at  9:00 AM    spironolactone (ALDACTONE) 25 MG tablet Take 1 tablet by mouth Daily.   11/6/2024 at  9:00 AM    glipizide (GLUCOTROL XL) 10 MG 24 hr tablet Take 1 tablet by mouth Daily.       insulin NPH-insulin regular (NovoLIN 70/30 ReliOn) (70-30) 100 UNIT/ML injection Inject 10 Units under the skin into the appropriate area as directed Every Night.       O2 (OXYGEN)  Inhale 2 L/min As Needed (soa).          Medicines:  Current Facility-Administered Medications   Medication Dose Route Frequency Provider Last Rate Last Admin    acetaminophen (TYLENOL) tablet 650 mg  650 mg Oral Q4H PRN Russ Wade MD   650 mg at 11/08/24 1323    Or    acetaminophen (TYLENOL) 160 MG/5ML oral solution 650 mg  650 mg Oral Q4H PRN Russ Wade MD        Or    acetaminophen (TYLENOL) suppository 650 mg  650 mg Rectal Q4H PRN Russ Wade MD        amiodarone (PACERONE) tablet 100 mg  100 mg Oral Daily Olamide Gomez APRN   100 mg at 11/08/24 1115    apixaban (ELIQUIS) tablet 2.5 mg  2.5 mg Oral BID Olamide Gomez APRN   2.5 mg at 11/08/24 1130    aspirin EC tablet 81 mg  81 mg Oral Daily Olamide Gomez APRN   81 mg at 11/08/24 1112    atorvastatin (LIPITOR) tablet 80 mg  80 mg Oral Daily Russ Wade MD        sennosides-docusate (PERICOLACE) 8.6-50 MG per tablet 2 tablet  2 tablet Oral BID PRN Russ Wade MD        And    polyethylene glycol (MIRALAX) packet 17 g  17 g Oral Daily PRN Russ Wade MD        And    bisacodyl (DULCOLAX) EC tablet 5 mg  5 mg Oral Daily PRN Russ Wade MD        And    bisacodyl (DULCOLAX) suppository 10 mg  10 mg Rectal Daily PRN Russ Wade MD        dextrose (D50W) (25 g/50 mL) IV injection 25 g  25 g Intravenous Q15 Min PRN Russ Wade MD        dextrose (GLUTOSE) oral gel 15 g  15 g Oral Q15 Min PRN Russ Wade MD        glucagon (GLUCAGEN) injection 1 mg  1 mg Intramuscular Q15 Min PRN Russ Wade MD        hyoscyamine (LEVSIN) SL tablet 250 mcg  250 mcg Sublingual Q4H PRN Olamide Gomez APRN   250 mcg at 11/08/24 0955    Insulin Lispro (humaLOG) injection 2-7 Units  2-7 Units Subcutaneous 4x Daily AC & at Bedtime Russ Wade MD   4 Units at 11/08/24 1139    ipratropium-albuterol (DUO-NEB) nebulizer solution 3 mL  3 mL Nebulization Q6H PRN Russ Wade MD         levothyroxine (SYNTHROID, LEVOTHROID) tablet 50 mcg  50 mcg Oral Daily Russ Wade MD        metoprolol succinate XL (TOPROL-XL) 24 hr tablet 50 mg  50 mg Oral Q24H Olamide APRN   50 mg at 11/08/24 1113    ondansetron ODT (ZOFRAN-ODT) disintegrating tablet 4 mg  4 mg Oral Q6H PRN Russ Wade MD        Or    ondansetron (ZOFRAN) injection 4 mg  4 mg Intravenous Q6H PRN Russ Wade MD        sodium bicarbonate 8.4 % 100 mEq in sodium chloride 0.45 % 1,000 mL infusion (greater than 100 mEq)  100 mEq Intravenous Continuous Ender Cartagena  mL/hr at 11/08/24 0715 100 mEq at 11/08/24 0715    sodium chloride 0.9 % flush 10 mL  10 mL Intravenous Q12H Russ Wade MD   10 mL at 11/08/24 0843    sodium chloride 0.9 % flush 10 mL  10 mL Intravenous PRN Russ Wade MD        sodium chloride 0.9 % infusion 40 mL  40 mL Intravenous PRN Russ Wade MD           Past Medical History:  Past Medical History:   Diagnosis Date    Atrial fibrillation     CHF (congestive heart failure)     COPD (chronic obstructive pulmonary disease)     Diabetes     Heart attack     x 3    Macular degeneration     Melanoma     Thyroid disorder        Past Surgical History:  Past Surgical History:   Procedure Laterality Date    CARDIAC CATHETERIZATION Bilateral 06/22/2020    Procedure: Coronary angiography;  Surgeon: Chao Álvarez MD;  Location:  [facility];  Service: Cardiology;  Laterality: Bilateral;    CARDIAC CATHETERIZATION N/A 06/22/2020    Procedure: Left ventriculography;  Surgeon: Chao Álvarez MD;  Location:  [facility];  Service: Cardiology;  Laterality: N/A;    CARDIAC CATHETERIZATION N/A 06/22/2020    Procedure: Left Heart Cath;  Surgeon: Chao Álvarez MD;  Location:  [facility];  Service: Cardiology;  Laterality: N/A;    CARDIAC CATHETERIZATION      x 4    CORONARY ANGIOPLASTY      HYSTERECTOMY      TONSILLECTOMY          Family History  Family History   Problem Relation Age of Onset    Diabetes Mother        Social History  Social History     Socioeconomic History    Marital status:    Tobacco Use    Smoking status: Former     Current packs/day: 0.00     Types: Cigarettes     Quit date: 3/6/2003     Years since quittin.6     Passive exposure: Past    Smokeless tobacco: Never   Vaping Use    Vaping status: Never Used   Substance and Sexual Activity    Alcohol use: No    Drug use: No    Sexual activity: Defer         Review of Systems:  History obtained from chart review and the patient  General ROS: No fever or chills  Respiratory ROS: No cough, shortness of breath, wheezing  Cardiovascular ROS: no chest pain or dyspnea on exertion  Gastrointestinal ROS: + abdominal pain, no melena  Genito-Urinary ROS: No dysuria or hematuria  14 point ROS reviewed with the patient and negative except as noted above and in the HPI unless unable to obtain.    Objective:  /70 (BP Location: Right arm, Patient Position: Lying)   Pulse 56   Temp 97.9 °F (36.6 °C) (Oral)   Resp 18   SpO2 93%     Intake/Output Summary (Last 24 hours) at 2024 1500  Last data filed at 2024 1321  Gross per 24 hour   Intake 2079 ml   Output --   Net 2079 ml     General: awake/alert   Head: Atraumatic, normocephalic  Neck: No masses, no JVD  Chest:  clear to auscultation bilaterally without respiratory distress  CVS: regular rate and rhythm  Abdominal: Soft, mildly tender to the right upper quadrant, no guarding, no organomegaly  Extremities: no cyanosis or edema  Skin: warm and dry without rash  Neuro: No focal motor deficits  Musculoskeletal: No obvious joint effusions    Labs:  Lab Results (last 7 days)       Procedure Component Value Units Date/Time    POC Glucose Once [[id number]]  (Abnormal) Collected: 24 1656    Specimen: Blood Updated: 24 1706     Glucose 190 mg/dL      Comment: : [id number] Maloney ID:  [id number]       PTH, Intact [[id number]]  (Abnormal) Collected: 11/07/24 1414    Specimen: Blood Updated: 11/07/24 1454     PTH, Intact 73.3 pg/mL     Narrative:      Results may be falsely decreased if patient taking Biotin.      Potassium [[id number]]  (Normal) Collected: 11/07/24 1414    Specimen: Blood Updated: 11/07/24 1446     Potassium 4.7 mmol/L     Hemoglobin A1c [[id number]]  (Abnormal) Collected: 11/07/24 1414    Specimen: Blood Updated: 11/07/24 1445     Hemoglobin A1C 9.60 %     Narrative:      Hemoglobin A1C Ranges:    Increased Risk for Diabetes  5.7% to 6.4%  Diabetes                     >= 6.5%  Diabetic Goal                < 7.0%    Basic Metabolic Panel [[id number]]  (Abnormal) Collected: 11/07/24 0947    Specimen: Blood Updated: 11/07/24 1020     Glucose 163 mg/dL      BUN 42 mg/dL      Creatinine 2.27 mg/dL      Sodium 133 mmol/L      Potassium 5.5 mmol/L      Comment: Slight hemolysis detected by analyzer. Result may be falsely elevated.        Chloride 102 mmol/L      CO2 20.0 mmol/L      Calcium 9.1 mg/dL      BUN/Creatinine Ratio 18.5     Anion Gap 11.0 mmol/L      eGFR 21.6 mL/min/1.73     Narrative:      GFR Normal >60  Chronic Kidney Disease <60  Kidney Failure <15    The GFR formula is only valid for adults with stable renal function between ages 18 and 70.    POC Glucose Once [[id number]]  (Abnormal) Collected: 11/07/24 0914    Specimen: Blood Updated: 11/07/24 0925     Glucose 158 mg/dL      Comment: : [id number] Deanperry Saldaña ([place] [place]: [id number]                Radiology:   Imaging Results (Last 72 Hours)       Procedure Component Value Units Date/Time    US Renal Bilateral [[id number]] Collected: 11/08/24 0927     Updated: 11/08/24 0937    Narrative:      EXAM/TECHNIQUE: US RENAL BILATERAL-     INDICATION: WILFRID; K81.1-Chronic cholecystitis     COMPARISON: None available.     FINDINGS:     Right kidney is 9.3 cm in length and demonstrates normal cortical  thickness and  "echogenicity. No right-sided shadowing calculi or  hydronephrosis.     Left kidney is 9.6 cm in length and demonstrates normal cortical  thickness and echogenicity. Linear calcification in the left hilar  region is indeterminate but favored to represent vascular calcification.  No urolithiasis or hydronephrosis.     No focal urinary bladder abnormality.       Impression:         Negative for hydronephrosis or renal atrophy.     This report was signed and finalized on 11/8/2024 9:34 AM by Dr. Smooth Anguiano MD.               Culture:  No components found for: \"WOUNDCUL\", \"3\"  No components found for: \"CSFCUL\", \"3\"  No components found for: \"BC\", \"3\"  No components found for: \"URINECUL\", \"3\"      Assessment   -Acute kidney injury-likely prerenal azotemia  -Cannot rule out underlying CKD stage IIIb or IV  -Type 2 diabetes with likely renal disease  -Hypertension  -Atrial fibrillation on chronic anticoagulation  -CHF  -Coronary artery disease  -Chronic cholecystitis  -Hyponatremia-hyperkalemia  -Metabolic acidosis      Plan:  Switch back to saline fluids.  Will continue to monitor electrolytes and acid-base base status.  Urine studies and renal ultrasound reviewed.  May now proceed with cholecystectomy under general anesthesia.  Patient may benefit from outpatient renal follow-up.  Avoid hypotension and nephrotoxins.        Ender Cartagena MD  11/8/2024  15:00 CST  "

## 2024-11-08 NOTE — PROGRESS NOTES
Patient Name: Kajal Huston  Date of Admission: 11/7/2024  Today's Date: 11/08/24  Length of Stay: 0  Primary Care Physician: Germaine Richard MD    Subjective   Chief Complaint: Abnormal lab values in pre-op cholecystectomy     Today:   Patient is resting comfortably in bed on room air with her daughter at bedside.  Patient is alert and oriented and able to participate in assessment.  Patient states she feels the same as yesterday, with intermittent abdominal cramping, will try Levsin this morning and evaluate for any improvement.  Patient's creatinine has improved overnight to 1.83, patient and daughter were told that she would need to continue with an additional 24 hours of IV hydration and since Dr. Hutson would like to put off patient's surgical procedure until at least next week if creatinine continues to improve patient will be discharged in the AM.  All patient and daughter's questions were answered to the best my ability and they are in agreement for current plan of care.      Vitals:    11/07/24 1930 11/07/24 2245 11/08/24 0501 11/08/24 0737   BP: 124/49 129/42 131/65 136/58   BP Location: Right arm Right arm Right arm Right arm   Patient Position: Lying Lying Lying Lying   Pulse: 53 55 52 53   Resp: 16 16 16 18   Temp: 98.5 °F (36.9 °C) 97.9 °F (36.6 °C) 97.6 °F (36.4 °C) 97.9 °F (36.6 °C)   TempSrc: Oral Oral Oral Oral   SpO2: 91% 90% 93% 91%          Intake/Output Summary (Last 24 hours) at 11/8/2024 1019  Last data filed at 11/8/2024 0715  Gross per 24 hour   Intake 1839 ml   Output --   Net 1839 ml        Review of Systems   Constitutional:  Positive for fatigue. Negative for chills and fever.   HENT:  Negative for congestion.    Gastrointestinal:  Positive for abdominal pain. Negative for diarrhea, nausea and vomiting.   Skin:  Positive for pallor.   Neurological:  Positive for weakness. Negative for dizziness.      All pertinent negatives and positives are as above. All other systems have  been reviewed and are negative unless otherwise stated.     Objective    Temp:  [97.6 °F (36.4 °C)-98.5 °F (36.9 °C)] 97.9 °F (36.6 °C)  Heart Rate:  [47-59] 53  Resp:  [12-18] 18  BP: (116-143)/(42-67) 136/58  Physical Exam  Vitals and nursing note reviewed.   Constitutional:       Appearance: Normal appearance.   HENT:      Head: Normocephalic and atraumatic.      Right Ear: Tympanic membrane normal.      Left Ear: Tympanic membrane normal.      Nose: Nose normal. No congestion or rhinorrhea.      Mouth/Throat:      Mouth: Mucous membranes are moist.      Pharynx: Oropharynx is clear.   Eyes:      Pupils: Pupils are equal, round, and reactive to light.   Cardiovascular:      Rate and Rhythm: Regular rhythm. Bradycardia present.      Pulses: Normal pulses.      Heart sounds: Normal heart sounds.   Pulmonary:      Effort: Pulmonary effort is normal. No respiratory distress.      Breath sounds: Normal breath sounds.   Chest:      Chest wall: No tenderness.   Abdominal:      General: Abdomen is flat. Bowel sounds are normal. There is no distension.      Palpations: Abdomen is soft.      Comments: No tenderness to palpation  Intermittent abdominal cramping   Genitourinary:     Comments: Voiding per bathroom privileges  Musculoskeletal:      Cervical back: Normal range of motion and neck supple. No rigidity or tenderness.      Right lower leg: No edema.      Left lower leg: No edema.   Skin:     General: Skin is warm.      Capillary Refill: Capillary refill takes less than 2 seconds.      Coloration: Skin is pale.   Neurological:      General: No focal deficit present.      Mental Status: She is alert.   Psychiatric:         Mood and Affect: Mood normal.         Behavior: Behavior normal.         Thought Content: Thought content normal.         Judgment: Judgment normal.       Results Review:  I have reviewed the labs, radiology results, and diagnostic studies.    Laboratory Data:   Results from last 7 days   Lab Units  11/08/24  0410   WBC 10*3/mm3 8.32   HEMOGLOBIN g/dL 10.9*   HEMATOCRIT % 36.0   PLATELETS 10*3/mm3 313        Results from last 7 days   Lab Units 11/08/24  0410 11/07/24  1414 11/07/24  0947   SODIUM mmol/L 137  --  133*   POTASSIUM mmol/L 4.4 4.7 5.5*   CHLORIDE mmol/L 104  --  102   CO2 mmol/L 24.0  --  20.0*   BUN mg/dL 31*  --  42*   CREATININE mg/dL 1.83*  --  2.27*   CALCIUM mg/dL 8.6  --  9.1   BILIRUBIN mg/dL 0.3  --   --    ALK PHOS U/L 82  --   --    ALT (SGPT) U/L 17  --   --    AST (SGOT) U/L 18  --   --    GLUCOSE mg/dL 148*  --  163*       Culture Data:     Microbiology Results (last 10 days)       ** No results found for the last 240 hours. **             Radiology Data:   Imaging Results (Last 7 Days)       Procedure Component Value Units Date/Time    US Renal Bilateral [[id number]] Collected: 11/08/24 0927     Updated: 11/08/24 0937    Narrative:      EXAM/TECHNIQUE: US RENAL BILATERAL-     INDICATION: WILFRID; K81.1-Chronic cholecystitis     COMPARISON: None available.     FINDINGS:     Right kidney is 9.3 cm in length and demonstrates normal cortical  thickness and echogenicity. No right-sided shadowing calculi or  hydronephrosis.     Left kidney is 9.6 cm in length and demonstrates normal cortical  thickness and echogenicity. Linear calcification in the left hilar  region is indeterminate but favored to represent vascular calcification.  No urolithiasis or hydronephrosis.     No focal urinary bladder abnormality.       Impression:         Negative for hydronephrosis or renal atrophy.     This report was signed and finalized on 11/8/2024 9:34 AM by Dr. Smooth Anguiano MD.                I have reviewed the patient's current medications.     apixaban, 2.5 mg, Oral, BID  atorvastatin, 80 mg, Oral, Daily  insulin lispro, 2-7 Units, Subcutaneous, 4x Daily AC & at Bedtime  levothyroxine, 50 mcg, Oral, Daily  metoprolol succinate XL, 50 mg, Oral, Q24H  sodium chloride, 10 mL, Intravenous, Q12H             Assessment/Plan   Assessment  Active Hospital Problems    Diagnosis     **Stage 2 acute kidney injury     Type 2 diabetes mellitus, without long-term current use of insulin     COPD without exacerbation     Chronic sinus bradycardia     Chronic cholecystitis        Treatment Plan  1.  Stage II acute kidney injury-baseline creatinine of 1.0, on admission creatinine of 2.27 which is improved since preoperative results on 10/31/2024 with a creatinine of 2.73.  This a.m. improved to 1.83 continue to follow urine output, monitor potassium level and renal function.  Renal ultrasound revealed no acute changes..  Continue to hold diuretics, discontinue losartan.  Nephrology consultation yesterday with recommendations for adding bicarb and continue IV hydration x 48 hours.      2.  Chronic cholecystitis-Dr. Hutson will continue to follow for surgical appropriateness as patient improves.  Restart Eliquis, per Dr. Hutson as surgical procedure will be rescheduled for next week..  Continue Levsin as needed for abdominal cramping     3.  Diabetes mellitus type 2-patient may remain n.p.o. after midnight, will continue low-dose SSI with Accu-Cheks before meals and at bedtime.  Continue to hold sulfonylurea and metformin.     4.  COPD without exacerbation-signs or symptoms of exacerbation, will continue supplemental oxygenation as needed.  No signs or symptoms of exacerbation, will continue supplemental oxygenation as needed.     5.  Chronic bradycardia-patient's baseline heart rate is approximately 45-50, her cardiologist is aware and was told to continue metoprolol tartrate.  Held metoprolol overnight and heart rate remained SB/S 50-60 restart and evaluate with overnight telemetry and overnight pulse oximetry for any overt nocturnal dyspnea.      VTE prophylaxis with home Eliquis   Labs in a.m.    Medical Decision Making  Stage I I WILFRID, acute on chronic, moderate complexity, unchanged  Chronic cholecystitis, chronic,  moderate complexity, unchanged  Diabetes mellitus type 2, chronic, moderate complexity, stable  COPD without exacerbation, chronic, moderate complexity, stable  Chronic bradycardia, chronic, moderate complexity, stable    Number and Complexity of problems: 5  Differential Diagnosis: None    Conditions and Status        Condition is improving.     [facility] Data  External documents reviewed: None  Cardiac tracing (EKG, telemetry) interpretation: Overnight cardiac telemetry SB/S 50-60  Radiology interpretation: Renal ultrasound per radiology reviewed   Labs reviewed: 11/8/2024 CMP, A1c, PTH, CBC with differential, urinalysis, urine sodium reviewed, repeat labs in a.m.  Any tests that were considered but not ordered: None     Decision rules/scores evaluated (example [id number], Wells, etc): None     Discussed with: Dr. Padilla, her daughter Shanique and patient     Care Planning  Shared decision making: Dr. Padilla, her daughter Shanique and patient  Code status and discussions: Full code per patient  Surrogate Decision Maker her daughter Shanique    Disposition  Social Determinants of Health that impact treatment or disposition: None determined at this time  I expect the patient to be discharged to home in 1 day.     Electronically signed by SARAH Rodriguez, 11/08/24, 10:19 CST.

## 2024-11-08 NOTE — PLAN OF CARE
Goal Outcome Evaluation:  Plan of Care Reviewed With: patient      Progress: no change     Pt A&O x4. Minimal c/o pain this shift; see MAR. IV fluids infusing per order. NPO since midnight for possible surgery today. Voiding per BRP, up with stand-by assist. Rm air. Sinus gilda per tele. VSS. Safety maintained.

## 2024-11-08 NOTE — CONSULTS
Nephrology ([facility]) Consult Note      Patient:  Kajal Huston  YOB: 1945  Date of Service: 11/7/2024  MRN: [id number]   Acct: [id number]   Primary Care Physician: Germaine Richard MD  Advance Directive:   There are no questions and answers to display.     Admit Date: 11/7/2024       Hospital Day: 0  Referring Provider: Mercedes Hutson MD      Patient Seen, Chart, Consults, Notes, Labs, Radiology studies reviewed.      Subjective:  Kajal Huston is a 79 y.o. female  whom we were consulted for acute kidney injury.  Patient has type 2 diabetes and has been on fark [employer].  She also has history of coronary artery disease, CHF, atrial fibrillation on Eliquis and hypertension.  She has been dealing with GI symptoms over the last 2 months with intermittent nausea and vomiting with right uadrant abdominal pain.  She was found with abnormal HIDA scan of the gallbladder on October 10.  Patient was seen by general surgery but was subsequently admitted on November 7 for elective cholecystectomy.  Back on October 31, patient was found with serum creatinine of 2.7 with GFR at 17.  She is unaware of underlying CKD and has not seen nephrology in the past.  On November 7 her serum creatinine has improved some and is down to 2.2.  Patient has no dysuria.  She denies NSAIDs abuse.  Her A1c was 9.6%.  She denies a change to her urine habits.  She was started on IV fluids.  During the stay she was found bradycardic and her Toprol has been stopped.    Allergies:  Patient has no known allergies.    Home Meds:  Medications Prior to Admission   Medication Sig Dispense Refill Last Dose/Taking    amiodarone (PACERONE) 100 MG tablet Take 1 tablet by mouth Daily.   11/6/2024 at  9:00 AM    apixaban (ELIQUIS) 5 MG tablet tablet Take 1 tablet by mouth 2 (Two) Times a Day.   Taking    aspirin 81 MG EC tablet Take 1 tablet by mouth Daily.   11/6/2024 at  9:00 AM    atorvastatin (LIPITOR) 80  MG tablet Take 1 tablet by mouth Daily.   11/6/2024 at  9:00 PM    cholecalciferol (VITAMIN D3) 25 MCG (1000 UT) tablet Take 1 tablet by mouth Daily.   11/6/2024 at  9:00 AM    dapagliflozin Propanediol (Farxiga) 10 MG tablet Take 10 mg by mouth Daily.   11/6/2024 at  9:00 AM    furosemide (LASIX) 20 MG tablet Take 1 tablet by mouth Daily.   11/6/2024 at  9:00 AM    levothyroxine (SYNTHROID, LEVOTHROID) 112 MCG tablet Take 0.5 tablets by mouth Daily.   11/6/2024    losartan (COZAAR) 50 MG tablet Take 1 tablet by mouth Daily.   11/5/2024 at  9:00 AM    metFORMIN (GLUCOPHAGE) 500 MG tablet Take 2 tablets by mouth 2 (Two) Times a Day With Meals. Patient taking 2 tablets every night (Patient taking differently: Take 2 tablets by mouth 2 (Two) Times a Day With Meals. Patient taking 2 tablets every night.)   Patient Taking Differently    metoprolol succinate XL (TOPROL-XL) 50 MG 24 hr tablet Take 1 tablet by mouth Daily.   11/7/2024 at  6:30 AM    Multiple Vitamins-Minerals (MULTIVITAMIN WITH MINERALS) tablet tablet Take 1 tablet by mouth Daily.   Taking    NOVOLIN 70/30 RELION (70-30) 100 UNIT/ML injection Inject 30 Units under the skin into the appropriate area as directed Daily.   11/6/2024 at  9:00 AM    spironolactone (ALDACTONE) 25 MG tablet Take 1 tablet by mouth Daily.   11/6/2024 at  9:00 AM    glipizide (GLUCOTROL XL) 10 MG 24 hr tablet Take 1 tablet by mouth Daily.       insulin NPH-insulin regular (NovoLIN 70/30 ReliOn) (70-30) 100 UNIT/ML injection Inject 10 Units under the skin into the appropriate area as directed Every Night.       O2 (OXYGEN) Inhale 2 L/min As Needed (soa).          Medicines:  Current Facility-Administered Medications   Medication Dose Route Frequency Provider Last Rate Last Admin    acetaminophen (TYLENOL) tablet 650 mg  650 mg Oral Q4H PRN Russ Wade MD        Or    acetaminophen (TYLENOL) 160 MG/5ML oral solution 650 mg  650 mg Oral Q4H PRN Russ Wade MD        Or     acetaminophen (TYLENOL) suppository 650 mg  650 mg Rectal Q4H PRN Russ Wade MD        apixaban (ELIQUIS) tablet 2.5 mg  2.5 mg Oral BID Olamide Gomez APRN        atorvastatin (LIPITOR) tablet 80 mg  80 mg Oral Daily Russ Wade MD        sennosides-docusate (PERICOLACE) 8.6-50 MG per tablet 2 tablet  2 tablet Oral BID PRN Russ Wade MD        And    polyethylene glycol (MIRALAX) packet 17 g  17 g Oral Daily PRN Russ Wade MD        And    bisacodyl (DULCOLAX) EC tablet 5 mg  5 mg Oral Daily PRN Russ Wade MD        And    bisacodyl (DULCOLAX) suppository 10 mg  10 mg Rectal Daily PRN Russ Wade MD        dextrose (D50W) (25 g/50 mL) IV injection 25 g  25 g Intravenous Q15 Min PRN Russ Wade MD        dextrose (GLUTOSE) oral gel 15 g  15 g Oral Q15 Min PRN Russ Wade MD        glucagon (GLUCAGEN) injection 1 mg  1 mg Intramuscular Q15 Min PRN Rsus Wade MD        hyoscyamine (LEVSIN) SL tablet 250 mcg  250 mcg Sublingual Q4H PRN Olamide Gomez APRN        Insulin Lispro (humaLOG) injection 2-7 Units  2-7 Units Subcutaneous 4x Daily AC & at Bedtime Russ Wade MD   2 Units at 11/07/24 3885    [Held by provider] insulin lispro protamine-insulin lispro (humaLOG 75-25) injection 15 Units  15 Units Subcutaneous BID With Meals Russ Wade MD        ipratropium-albuterol (DUO-NEB) nebulizer solution 3 mL  3 mL Nebulization Q6H PRN Russ Wade MD        levothyroxine (SYNTHROID, LEVOTHROID) tablet 50 mcg  50 mcg Oral Daily Russ Wade MD        ondansetron ODT (ZOFRAN-ODT) disintegrating tablet 4 mg  4 mg Oral Q6H PRN Russ Wade MD        Or    ondansetron (ZOFRAN) injection 4 mg  4 mg Intravenous Q6H PRN Russ Wade MD        sodium chloride 0.9 % flush 10 mL  10 mL Intravenous Q12H Russ Wade MD        sodium chloride 0.9 % flush 10 mL  10 mL Intravenous PRN Russ Wade MD        sodium  chloride 0.9 % infusion 40 mL  40 mL Intravenous PRN Russ Wade MD        sodium chloride 0.9 % infusion  100 mL/hr Intravenous Continuous Russ Wade  mL/hr at 24 100 mL/hr at 24       Past Medical History:  Past Medical History:   Diagnosis Date    Atrial fibrillation     CHF (congestive heart failure)     COPD (chronic obstructive pulmonary disease)     Diabetes     Heart attack     x 3    Macular degeneration     Melanoma     Thyroid disorder        Past Surgical History:  Past Surgical History:   Procedure Laterality Date    CARDIAC CATHETERIZATION Bilateral 2020    Procedure: Coronary angiography;  Surgeon: Chao Álvarez MD;  Location:  [facility];  Service: Cardiology;  Laterality: Bilateral;    CARDIAC CATHETERIZATION N/A 2020    Procedure: Left ventriculography;  Surgeon: Chao Álvarez MD;  Location:  [facility];  Service: Cardiology;  Laterality: N/A;    CARDIAC CATHETERIZATION N/A 2020    Procedure: Left Heart Cath;  Surgeon: Chao Álvarez MD;  Location:  [facility];  Service: Cardiology;  Laterality: N/A;    CARDIAC CATHETERIZATION      x 4    CORONARY ANGIOPLASTY      HYSTERECTOMY      TONSILLECTOMY         Family History  Family History   Problem Relation Age of Onset    Diabetes Mother        Social History  Social History     Socioeconomic History    Marital status:    Tobacco Use    Smoking status: Former     Current packs/day: 0.00     Types: Cigarettes     Quit date: 3/6/2003     Years since quittin.6     Passive exposure: Past    Smokeless tobacco: Never   Vaping Use    Vaping status: Never Used   Substance and Sexual Activity    Alcohol use: No    Drug use: No    Sexual activity: Defer         Review of Systems:  History obtained from chart review and the patient  General ROS: No fever or chills  Respiratory ROS: No cough, shortness of breath, wheezing  Cardiovascular  ROS: no chest pain or dyspnea on exertion  Gastrointestinal ROS: + abdominal pain, no melena  Genito-Urinary ROS: No dysuria or hematuria  14 point ROS reviewed with the patient and negative except as noted above and in the HPI unless unable to obtain.    Objective:  /42 (BP Location: Right arm, Patient Position: Lying)   Pulse 52   Temp 97.9 °F (36.6 °C) (Oral)   Resp 18   SpO2 93%     Intake/Output Summary (Last 24 hours) at 11/7/2024 1840  Last data filed at 11/7/2024 1802  Gross per 24 hour   Intake 240 ml   Output --   Net 240 ml     General: awake/alert   Head: Atraumatic, normocephalic  Neck: No masses, no JVD  Chest:  clear to auscultation bilaterally without respiratory distress  CVS: regular rate and rhythm  Abdominal: Soft, mildly tender to the right upper quadrant, no guarding, no organomegaly  Extremities: no cyanosis or edema  Skin: warm and dry without rash  Neuro: No focal motor deficits  Musculoskeletal: No obvious joint effusions    Labs:  Lab Results (last 7 days)       Procedure Component Value Units Date/Time    POC Glucose Once [[id number]]  (Abnormal) Collected: 11/07/24 1656    Specimen: Blood Updated: 11/07/24 1706     Glucose 190 mg/dL      Comment: : [id number] Sincere JennyMeter ID: [id number]       PTH, Intact [[id number]]  (Abnormal) Collected: 11/07/24 1414    Specimen: Blood Updated: 11/07/24 1454     PTH, Intact 73.3 pg/mL     Narrative:      Results may be falsely decreased if patient taking Biotin.      Potassium [[id number]]  (Normal) Collected: 11/07/24 1414    Specimen: Blood Updated: 11/07/24 1446     Potassium 4.7 mmol/L     Hemoglobin A1c [[id number]]  (Abnormal) Collected: 11/07/24 1414    Specimen: Blood Updated: 11/07/24 1445     Hemoglobin A1C 9.60 %     Narrative:      Hemoglobin A1C Ranges:    Increased Risk for Diabetes  5.7% to 6.4%  Diabetes                     >= 6.5%  Diabetic Goal                < 7.0%    Basic Metabolic Panel [[id number]]  (Abnormal)  "Collected: 11/07/24 0947    Specimen: Blood Updated: 11/07/24 1020     Glucose 163 mg/dL      BUN 42 mg/dL      Creatinine 2.27 mg/dL      Sodium 133 mmol/L      Potassium 5.5 mmol/L      Comment: Slight hemolysis detected by analyzer. Result may be falsely elevated.        Chloride 102 mmol/L      CO2 20.0 mmol/L      Calcium 9.1 mg/dL      BUN/Creatinine Ratio 18.5     Anion Gap 11.0 mmol/L      eGFR 21.6 mL/min/1.73     Narrative:      GFR Normal >60  Chronic Kidney Disease <60  Kidney Failure <15    The GFR formula is only valid for adults with stable renal function between ages 18 and 70.    POC Glucose Once [[id number]]  (Abnormal) Collected: 11/07/24 0914    Specimen: Blood Updated: 11/07/24 0925     Glucose 158 mg/dL      Comment: : [id number] Chantal Mcarthur) LindseyMeter ID: [id number]                Radiology:   Imaging Results (Last 72 Hours)       ** No results found for the last 72 hours. **            Culture:  No components found for: \"WOUNDCUL\", \"3\"  No components found for: \"CSFCUL\", \"3\"  No components found for: \"BC\", \"3\"  No components found for: \"URINECUL\", \"3\"      Assessment   -Acute kidney injury-likely prerenal azotemia  -Cannot rule out underlying CKD stage IIIb or IV  -Type 2 diabetes with likely renal disease  -Hypertension  -Atrial fibrillation on chronic anticoagulation  -CHF  -Coronary artery disease  -Chronic cholecystitis  -Hyponatremia-hyperkalemia  -Metabolic acidosis      Plan:  At this stage, I agree with hydration.  I will switch to bicarbonate based IV fluids.  Will continue to monitor electrolytes and acid-base base status.  Patient might have an a component of acute kidney failure.  We will check urine studies and renal ultrasound.  If her kidney numbers improve, may then proceed with cholecystectomy under general anesthesia.  Patient may benefit from outpatient renal follow-up.  Avoid hypotension and nephrotoxins.      Thank you for the consult, we appreciate the opportunity " to provide care to your patients.  Feel free to contact me if I can be of any further assistance.      Ender Cartagena MD  11/7/2024  18:40 CST

## 2024-11-08 NOTE — PLAN OF CARE
Goal Outcome Evaluation:  Plan of Care Reviewed With: patient        Progress: improving  Outcome Evaluation: Renal Ultrasound completed today. IV fluids switched to N. Saline. Accu check. GI diet started today. Room air. Minimal complaints of pain. Voiding. Alert x4. Sleep study ordered for tonight. Patient hopeful to go home in the AM. Safety maintained.

## 2024-11-08 NOTE — CASE MANAGEMENT/SOCIAL WORK
Discharge Planning Assessment  [facility]     Patient Name: Kajal Huston  MRN: [id number]  Today's Date: 11/8/2024    Admit Date: 11/7/2024        Discharge Needs Assessment       Row Name 11/08/24 1053       Living Environment    People in Home alone    Current Living Arrangements home    Potentially Unsafe Housing Conditions none    In the past 12 months has the electric, gas, oil, or water company threatened to shut off services in your home? No    Primary Care Provided by self    Provides Primary Care For no one    Family Caregiver if Needed child(jett), adult;grandchild(jett), adult    Family Caregiver Names Shanique Olmos (Daughter)  [phone] (Home Phone)    Quality of Family Relationships helpful;involved    Able to Return to Prior Arrangements yes       Resource/Environmental Concerns    Resource/Environmental Concerns none    Transportation Concerns none       Transportation Needs    In the past 12 months, has lack of transportation kept you from medical appointments or from getting medications? no    In the past 12 months, has lack of transportation kept you from meetings, work, or from getting things needed for daily living? No       Food Insecurity    Within the past 12 months, you worried that your food would run out before you got the money to buy more. Never true    Within the past 12 months, the food you bought just didn't last and you didn't have money to get more. Never true       Transition Planning    Patient/Family Anticipates Transition to home    Patient/Family Anticipated Services at Transition none    Transportation Anticipated family or friend will provide       Discharge Needs Assessment    Equipment Currently Used at Home oxygen    Concerns to be Addressed denies needs/concerns at this time    Do you want help finding or keeping work or a job? I do not need or want help    Do you want help with school or training? For example, starting or completing job training or getting a high  school diploma, GED or equivalent No    Anticipated Changes Related to Illness none    Equipment Needed After Discharge none    Discharge Coordination/Progress Lives alone but has daughter and family to assist. Has prn oxygen at home. Has established primary care and Medicare coverage. Denies any current needs but CM/SS will follow and be available to assist with any needed discharge planning.                   Discharge Plan    No documentation.                 Continued Care and Services - Admitted Since 11/7/2024    No active coordination exists for this encounter.          Demographic Summary    No documentation.                  Functional Status    No documentation.                  Psychosocial    No documentation.                  Abuse/Neglect    No documentation.                  Legal    No documentation.                  Substance Abuse    No documentation.                  Patient Forms    No documentation.                     Tianna Vidal RN

## 2024-11-09 VITALS
OXYGEN SATURATION: 94 % | HEART RATE: 59 BPM | WEIGHT: 130 LBS | RESPIRATION RATE: 16 BRPM | BODY MASS INDEX: 23.04 KG/M2 | TEMPERATURE: 98 F | HEIGHT: 63 IN | SYSTOLIC BLOOD PRESSURE: 149 MMHG | DIASTOLIC BLOOD PRESSURE: 49 MMHG

## 2024-11-09 LAB
ANION GAP SERPL CALCULATED.3IONS-SCNC: 12 MMOL/L (ref 5–15)
BUN SERPL-MCNC: 24 MG/DL (ref 8–23)
BUN/CREAT SERPL: 17 (ref 7–25)
CALCIUM SPEC-SCNC: 8.6 MG/DL (ref 8.6–10.5)
CHLORIDE SERPL-SCNC: 102 MMOL/L (ref 98–107)
CO2 SERPL-SCNC: 20 MMOL/L (ref 22–29)
CREAT SERPL-MCNC: 1.41 MG/DL (ref 0.57–1)
EGFRCR SERPLBLD CKD-EPI 2021: 38.3 ML/MIN/1.73
GLUCOSE BLDC GLUCOMTR-MCNC: 192 MG/DL (ref 70–130)
GLUCOSE SERPL-MCNC: 216 MG/DL (ref 65–99)
POTASSIUM SERPL-SCNC: 4.9 MMOL/L (ref 3.5–5.2)
SODIUM SERPL-SCNC: 134 MMOL/L (ref 136–145)

## 2024-11-09 PROCEDURE — 63710000001 AMIODARONE 200 MG TABLET

## 2024-11-09 PROCEDURE — A9270 NON-COVERED ITEM OR SERVICE: HCPCS

## 2024-11-09 PROCEDURE — A9270 NON-COVERED ITEM OR SERVICE: HCPCS | Performed by: FAMILY MEDICINE

## 2024-11-09 PROCEDURE — 80048 BASIC METABOLIC PNL TOTAL CA: CPT | Performed by: INTERNAL MEDICINE

## 2024-11-09 PROCEDURE — 63710000001 INSULIN LISPRO (HUMAN) PER 5 UNITS: Performed by: FAMILY MEDICINE

## 2024-11-09 PROCEDURE — 63710000001 ASPIRIN 81 MG TABLET DELAYED-RELEASE

## 2024-11-09 PROCEDURE — 82948 REAGENT STRIP/BLOOD GLUCOSE: CPT

## 2024-11-09 PROCEDURE — 63710000001 ATORVASTATIN 40 MG TABLET: Performed by: FAMILY MEDICINE

## 2024-11-09 PROCEDURE — G0378 HOSPITAL OBSERVATION PER HR: HCPCS

## 2024-11-09 PROCEDURE — 63710000001 METOPROLOL SUCCINATE XL 50 MG TABLET SUSTAINED-RELEASE 24 HOUR

## 2024-11-09 PROCEDURE — 63710000001 LEVOTHYROXINE 50 MCG TABLET: Performed by: FAMILY MEDICINE

## 2024-11-09 PROCEDURE — 63710000001 APIXABAN 2.5 MG TABLET

## 2024-11-09 RX ADMIN — Medication 10 ML: at 08:23

## 2024-11-09 RX ADMIN — LEVOTHYROXINE SODIUM 50 MCG: 0.05 TABLET ORAL at 08:21

## 2024-11-09 RX ADMIN — ATORVASTATIN CALCIUM 80 MG: 40 TABLET, FILM COATED ORAL at 08:20

## 2024-11-09 RX ADMIN — INSULIN LISPRO 2 UNITS: 100 INJECTION, SOLUTION INTRAVENOUS; SUBCUTANEOUS at 08:20

## 2024-11-09 RX ADMIN — APIXABAN 2.5 MG: 2.5 TABLET, FILM COATED ORAL at 08:20

## 2024-11-09 RX ADMIN — METOPROLOL SUCCINATE 50 MG: 50 TABLET, EXTENDED RELEASE ORAL at 08:21

## 2024-11-09 RX ADMIN — AMIODARONE HYDROCHLORIDE 100 MG: 200 TABLET ORAL at 08:20

## 2024-11-09 RX ADMIN — ASPIRIN 81 MG: 81 TABLET, COATED ORAL at 08:20

## 2024-11-09 NOTE — SIGNIFICANT NOTE
Patient began desaturating this morning to 87% I was called to place patient on 2L nasal cannula, patients sats went back to 95%. Patient was uncooperative and refused the sleep study at 00:30 after the vital sync was currently being monitored. Patient stated she did not want to be charged for the overnight and was leaving in the morning. Patient's RN was in room for discussion.

## 2024-11-09 NOTE — DISCHARGE SUMMARY
[facility] Medicine Services  DISCHARGE SUMMARY       Date of Admission: 11/7/2024  Date of Discharge:  11/9/2024  Primary Care Physician: Germaine Richard MD    Presenting Problem/History of Present Illness:  Abnormal preop cholecystectomy labs    Final Discharge Diagnoses:  Active Hospital Problems    Diagnosis     **Stage 2 acute kidney injury     Type 2 diabetes mellitus, without long-term current use of insulin     COPD without exacerbation     Chronic sinus bradycardia     Chronic cholecystitis        Consults: Dr. Cartagena-nephrology, Dr. Paulina Hutson, general surgery    Procedures Performed: None    Pertinent Test Results:       Imaging Results (All)       Procedure Component Value Units Date/Time    US Renal Bilateral [[id number]] Collected: 11/08/24 0927     Updated: 11/08/24 0937    Narrative:      EXAM/TECHNIQUE: US RENAL BILATERAL-     INDICATION: WILFRID; K81.1-Chronic cholecystitis     COMPARISON: None available.     FINDINGS:     Right kidney is 9.3 cm in length and demonstrates normal cortical  thickness and echogenicity. No right-sided shadowing calculi or  hydronephrosis.     Left kidney is 9.6 cm in length and demonstrates normal cortical  thickness and echogenicity. Linear calcification in the left hilar  region is indeterminate but favored to represent vascular calcification.  No urolithiasis or hydronephrosis.     No focal urinary bladder abnormality.       Impression:         Negative for hydronephrosis or renal atrophy.     This report was signed and finalized on 11/8/2024 9:34 AM by Dr. Smooth Anguiano MD.             LAB RESULTS:      Lab 11/08/24  0410   WBC 8.32   HEMOGLOBIN 10.9*   HEMATOCRIT 36.0   PLATELETS 313   NEUTROS ABS 4.63   IMMATURE GRANS (ABS) 0.03   LYMPHS ABS 2.23   MONOS ABS 1.03*   EOS ABS 0.30   MCV 82.6         Lab 11/09/24  0609 11/08/24  0410 11/07/24  1414 11/07/24  0947   SODIUM 134* 137  --  133*   POTASSIUM 4.9 4.4 4.7 5.5*    CHLORIDE 102 104  --  102   CO2 20.0* 24.0  --  20.0*   ANION GAP 12.0 9.0  --  11.0   BUN 24* 31*  --  42*   CREATININE 1.41* 1.83*  --  2.27*   EGFR 38.3* 28.0*  --  21.6*   GLUCOSE 216* 148*  --  163*   CALCIUM 8.6 8.6  --  9.1   PHOSPHORUS  --  3.5  --   --    HEMOGLOBIN A1C  --   --  9.60*  --          Lab 11/08/24  0410   TOTAL PROTEIN 5.9*   ALBUMIN 3.4*   GLOBULIN 2.5   ALT (SGPT) 17   AST (SGOT) 18   BILIRUBIN 0.3   ALK PHOS 82                     Brief Urine Lab Results  (Last result in the past 365 days)        Color   Clarity   Blood   Leuk Est   Nitrite   Protein   CREAT   Urine HCG        11/07/24 2027             36.4         11/07/24 2027 Yellow   Clear   Negative   Trace   Negative   Negative                 Microbiology Results (last 10 days)       ** No results found for the last 240 hours. **          Microbiology Results (last 10 days)       ** No results found for the last 240 hours. **             Documented weights    11/08/24 1920   Weight: 59 kg (130 lb)        Hospital Course: Kajal Huston is a 79-year-old female with a past medical history of atrial fibrillation, congestive heart failure, most recent echocardiogram performed December 2023 with an EF of 20-25%, patient states she had a recent echocardiogram performed at [facility], unable to review which she states her EF had improved to 50-55%.  COPD, diabetes type 2, macular degeneration, melanoma and thyroid disorder.  Patient was in preop today for scheduled cholecystectomy due to chronic cholecystitis per Dr. Paulina Hutson, general surgery.  Patient has had significant abdominal pain over the last few weeks, Dr. Hutson evaluated the patient and given the nature, timing and duration are most consistent with biliary colic or Haresh will perform procedure to evaluate for resolution with recommendations for GI consult and EGD if no significant improvement.  Patient was being worked up in preop this morning when it was  discovered that she creatinine was elevated at 2.27, which was an improvement from her creatinine 10/31/2024 of 2.73.  Baseline appeared to be approximately 1.1, which places patient in stage II acute kidney injury.    Upon assessment patient stated that she knew her creatinine had been mildly elevated at the beginning of the year as her PCP took her off of her Lasix for a few weeks.  Patient additionally stated that she was told her kidney function was mildly elevated after she initiated Farxiga post AMI.  Patient additionally states that her bradycardia is chronic and patient has been told to continue her metoprolol tartrate 50 mg daily as long as her heart rate remains greater than 45-50.  Patient was made aware that preoperatively we will hold and evaluate for improvement preoperatively.  Patient and daughter were made aware that patient will be evaluated by nephrology, renal ultrasound, holding of all nephrotoxic agents, and will reevaluate in a.m. with Dr. Hutson for possible operative procedure.  Patient was then admitted for continued evaluation and treatment.    Patient was hydrated over the last 48 hours in conjunction with nephrology consult per Dr. Cartagena. Patient received addition of bicarb for 24 hours, discontinued yesterday and patient is continued normal saline till this morning when patients went as patient's creatinine improved to 1.41.  Discussed the case with Dr. Cartagena who recommended patient continue p.o. intake over the weekend and is now stable for proceeding with cholecystectomy when Dr. Hutson deems appropriate.  Patient was notified of the discontinuation of all nephrotoxic agents seen below, patient will discuss restarting medications with Dr. Cartagena and with her PCP post surgical intervention.     Bradycardia was consistent and chronic as patient had previously stated.  She has maintained normal sinus rhythm to sinus bradycardia throughout hospitalization with the restarting of  "her home dose of metoprolol.     Patient verbalized understanding of all plan of care, she was given an order for for a BMP to be drawn on 11/13/2024 at [facility] with results to be sent to Dr. Mercedes Hutson.  All patient's questions were answered to the best my ability and she is in agreement for discharge home today.    Patient has reached the maximum benefit of hospitalization and is stable for discharge  Patient has been evaluated today 11/09/24 and is stable for discharge.       Physical Exam on Discharge:  /49 (BP Location: Right arm, Patient Position: Lying)   Pulse 59   Temp 98 °F (36.7 °C) (Oral)   Resp 16   Ht 159 cm (62.6\")   Wt 59 kg (130 lb)   SpO2 94%   BMI 23.33 kg/m²   Physical Exam  Vitals and nursing note reviewed.   Constitutional:       Appearance: Normal appearance.   HENT:      Head: Normocephalic and atraumatic.      Right Ear: Tympanic membrane normal.      Left Ear: Tympanic membrane normal.      Nose: Nose normal. No congestion or rhinorrhea.      Mouth/Throat:      Mouth: Mucous membranes are moist.      Pharynx: Oropharynx is clear.   Eyes:      Pupils: Pupils are equal, round, and reactive to light.   Cardiovascular:      Rate and Rhythm: Regular rhythm. Bradycardia present.      Pulses: Normal pulses.      Heart sounds: Normal heart sounds.   Pulmonary:      Effort: Pulmonary effort is normal. No respiratory distress.      Breath sounds: Normal breath sounds.   Chest:      Chest wall: No tenderness.   Abdominal:      General: Abdomen is flat. Bowel sounds are normal. There is no distension.      Palpations: Abdomen is soft.      Comments: No tenderness to palpation  Intermittent abdominal cramping   Genitourinary:     Comments: Voiding per bathroom privileges  Musculoskeletal:      Cervical back: Normal range of motion and neck supple. No rigidity or tenderness.      Right lower leg: No edema.      Left lower leg: No edema.   Skin:     General: Skin is warm.      " Capillary Refill: Capillary refill takes less than 2 seconds.      Coloration: Skin is pale.   Neurological:      General: No focal deficit present.      Mental Status: She is alert.   Psychiatric:         Mood and Affect: Mood normal.         Behavior: Behavior normal.         Thought Content: Thought content normal.         Judgment: Judgment normal.           Condition on Discharge: Stable for discharge home    Discharge Disposition:  Home or Self Care    Discharge Medications:     Discharge Medications        Continue These Medications        Instructions Start Date   amiodarone 100 MG tablet  Commonly known as: PACERONE   100 mg, Oral, Daily      apixaban 5 MG tablet tablet  Commonly known as: ELIQUIS   5 mg, Oral, 2 Times Daily      aspirin 81 MG EC tablet   81 mg, Oral, Daily      atorvastatin 80 MG tablet  Commonly known as: LIPITOR   80 mg, Oral, Daily      cholecalciferol 25 MCG (1000 UT) tablet  Commonly known as: VITAMIN D3   1,000 Units, Oral, Daily      glipizide 10 MG 24 hr tablet  Commonly known as: GLUCOTROL XL   10 mg, Oral, Daily      levothyroxine 112 MCG tablet  Commonly known as: SYNTHROID, LEVOTHROID   0.5 tablets, Oral, Daily      metoprolol succinate XL 50 MG 24 hr tablet  Commonly known as: TOPROL-XL   50 mg, Oral, Daily      multivitamin with minerals tablet tablet   1 tablet, Oral, Daily      NovoLIN 70/30 ReliOn (70-30) 100 UNIT/ML injection  Generic drug: insulin NPH-insulin regular   30 Units, Subcutaneous, Daily      NovoLIN 70/30 ReliOn (70-30) 100 UNIT/ML injection  Generic drug: insulin NPH-insulin regular   10 Units, Subcutaneous, Nightly      O2  Commonly known as: OXYGEN   2 L/min, Inhalation, As Needed             Stop These Medications      Farxiga 10 MG tablet  Generic drug: dapagliflozin Propanediol     furosemide 20 MG tablet  Commonly known as: LASIX     losartan 50 MG tablet  Commonly known as: COZAAR     metFORMIN 500 MG tablet  Commonly known as: GLUCOPHAGE      spironolactone 25 MG tablet  Commonly known as: ALDACTONE            Discharge Diet:   Diet Instructions       Diet: Gastrointestinal Diets; Low Irritant; Regular (IDDSI 7); Thin (IDDSI 0)      Discharge Diet: Gastrointestinal Diets    Gastrointestinal Diet: Low Irritant    Texture: Regular (IDDSI 7)    Fluid Consistency: Thin (IDDSI 0)            Activity at Discharge:   Activity Instructions       Activity as Tolerated              Discharge Instructions:   1.  Patient was instructed return for medical attention for any new chest pain, shortness of breath, significant increase to abdominal discomfort, or difficulty urinating.  2.  Patient was instructed to follow-up with PCP in 1 week.  3.  Patient was instructed to follow-up with TABITHA Elizabeth, nephrology in 2 weeks.  4.  Patient was instructed to call Dr. Mercedes Hutson office if not notified on Monday of new surgery date.  5.  Patient was instructed to have a BMP drawn at [facility] on Wednesday the 13th.  6.  Patient was instructed to discontinue all nephrotoxic agents listed in discharge and to discuss with PCP about restarting post surgery.    Follow-up Appointments:   No future appointments.    Test Results Pending at Discharge: BMP on 11/13/2024    Electronically signed by SARAH Rodriguez, 11/09/24, 08:11 CST.    Time: 40 minutes.

## 2024-11-09 NOTE — PLAN OF CARE
Goal Outcome Evaluation:  Plan of Care Reviewed With: patient        Progress: improving        Patient with minimal complaints of pain so far during shift; see MAR. IVF infusing per order. Accu check. Patient refused overnight sleep study. Up with SBA. Voiding. Tolerating diet. Tele on. O2@2L placed at night. Safety maintained. Family at bedside.

## 2024-11-15 ENCOUNTER — TELEPHONE (OUTPATIENT)
Dept: SURGERY | Facility: CLINIC | Age: 79
End: 2024-11-15
Payer: MEDICARE

## 2024-11-15 ENCOUNTER — PREP FOR SURGERY (OUTPATIENT)
Dept: OTHER | Facility: HOSPITAL | Age: 79
End: 2024-11-15
Payer: MEDICARE

## 2024-11-15 DIAGNOSIS — R10.11 ABDOMINAL PAIN, RIGHT UPPER QUADRANT: Primary | ICD-10-CM

## 2024-11-15 DIAGNOSIS — K81.1 CHRONIC CHOLECYSTITIS: ICD-10-CM

## 2024-11-15 LAB
QT INTERVAL: 520 MS
QTC INTERVAL: 469 MS

## 2024-11-15 RX ORDER — INDOCYANINE GREEN AND WATER 25 MG
3.75 KIT INJECTION ONCE
OUTPATIENT
Start: 2024-11-15

## 2024-11-15 RX ORDER — HEPARIN SODIUM 5000 [USP'U]/ML
5000 INJECTION, SOLUTION INTRAVENOUS; SUBCUTANEOUS EVERY 8 HOURS SCHEDULED
OUTPATIENT
Start: 2024-11-15

## 2024-11-15 NOTE — TELEPHONE ENCOUNTER
Kajalnic Huston is scheduled for surgery with Dr. Hutson on 12/17/2024 with an arrival time of 9:00AM.  You will check in at the main registration desk.   On the day of surgery you will need a .   We ask that you do not eat or drink anything after midnight on the day of surgery.   Please do not take any anti-inflammatory or weight loss medications, vitamins, ibuprofen, aleve, Advil, motrin, Excedrin, mobic, meloxicam for 7 days prior to surgery.   Stop your blood thinner Eliquis for 2 days prior to surgery.   Do not take medications on the day of surgery.  ______________________________________________________________________________________________________________________________________  Your pre-work appointment will be on 12/10/2024 arrive at 11:30AM, you will check in at main registration.   For your pre-work appointment you do not have to fast.   For pre-work you do not need a . You will get some lab work and sign consent for surgery.   You may also get an EKG and/or chest xray if needed.     If you have any questions do not hesitate to reach out.   You can reach us at [phone] hit option 2.     Left message with patient.

## 2024-12-10 ENCOUNTER — PRE-ADMISSION TESTING (OUTPATIENT)
Dept: PREADMISSION TESTING | Facility: HOSPITAL | Age: 79
End: 2024-12-10
Payer: MEDICARE

## 2024-12-10 VITALS
RESPIRATION RATE: 16 BRPM | DIASTOLIC BLOOD PRESSURE: 62 MMHG | BODY MASS INDEX: 23.09 KG/M2 | HEART RATE: 57 BPM | WEIGHT: 130.29 LBS | SYSTOLIC BLOOD PRESSURE: 125 MMHG | HEIGHT: 63 IN | OXYGEN SATURATION: 93 %

## 2024-12-10 DIAGNOSIS — K81.1 CHRONIC CHOLECYSTITIS: ICD-10-CM

## 2024-12-10 DIAGNOSIS — R10.11 ABDOMINAL PAIN, RIGHT UPPER QUADRANT: ICD-10-CM

## 2024-12-10 LAB
ALBUMIN SERPL-MCNC: 3.6 G/DL (ref 3.5–5.2)
ALBUMIN/GLOB SERPL: 1.2 G/DL
ALP SERPL-CCNC: 102 U/L (ref 39–117)
ALT SERPL W P-5'-P-CCNC: 15 U/L (ref 1–33)
ANION GAP SERPL CALCULATED.3IONS-SCNC: 11 MMOL/L (ref 5–15)
AST SERPL-CCNC: 16 U/L (ref 1–32)
BASOPHILS # BLD AUTO: 0.09 10*3/MM3 (ref 0–0.2)
BASOPHILS NFR BLD AUTO: 0.9 % (ref 0–1.5)
BILIRUB SERPL-MCNC: 0.4 MG/DL (ref 0–1.2)
BUN SERPL-MCNC: 25 MG/DL (ref 8–23)
BUN/CREAT SERPL: 13.7 (ref 7–25)
CALCIUM SPEC-SCNC: 8.9 MG/DL (ref 8.6–10.5)
CHLORIDE SERPL-SCNC: 100 MMOL/L (ref 98–107)
CO2 SERPL-SCNC: 23 MMOL/L (ref 22–29)
CREAT SERPL-MCNC: 1.83 MG/DL (ref 0.57–1)
DEPRECATED RDW RBC AUTO: 50 FL (ref 37–54)
EGFRCR SERPLBLD CKD-EPI 2021: 28 ML/MIN/1.73
EOSINOPHIL # BLD AUTO: 0.18 10*3/MM3 (ref 0–0.4)
EOSINOPHIL NFR BLD AUTO: 1.8 % (ref 0.3–6.2)
ERYTHROCYTE [DISTWIDTH] IN BLOOD BY AUTOMATED COUNT: 16.6 % (ref 12.3–15.4)
GLOBULIN UR ELPH-MCNC: 2.9 GM/DL
GLUCOSE SERPL-MCNC: 351 MG/DL (ref 65–99)
HCT VFR BLD AUTO: 37.4 % (ref 34–46.6)
HGB BLD-MCNC: 11.2 G/DL (ref 12–15.9)
IMM GRANULOCYTES # BLD AUTO: 0.03 10*3/MM3 (ref 0–0.05)
IMM GRANULOCYTES NFR BLD AUTO: 0.3 % (ref 0–0.5)
LYMPHOCYTES # BLD AUTO: 1.84 10*3/MM3 (ref 0.7–3.1)
LYMPHOCYTES NFR BLD AUTO: 18.7 % (ref 19.6–45.3)
MCH RBC QN AUTO: 24.7 PG (ref 26.6–33)
MCHC RBC AUTO-ENTMCNC: 29.9 G/DL (ref 31.5–35.7)
MCV RBC AUTO: 82.4 FL (ref 79–97)
MONOCYTES # BLD AUTO: 0.92 10*3/MM3 (ref 0.1–0.9)
MONOCYTES NFR BLD AUTO: 9.4 % (ref 5–12)
NEUTROPHILS NFR BLD AUTO: 6.77 10*3/MM3 (ref 1.7–7)
NEUTROPHILS NFR BLD AUTO: 68.9 % (ref 42.7–76)
NRBC BLD AUTO-RTO: 0 /100 WBC (ref 0–0.2)
PLATELET # BLD AUTO: 381 10*3/MM3 (ref 140–450)
PMV BLD AUTO: 11.6 FL (ref 6–12)
POTASSIUM SERPL-SCNC: 4.1 MMOL/L (ref 3.5–5.2)
PROT SERPL-MCNC: 6.5 G/DL (ref 6–8.5)
RBC # BLD AUTO: 4.54 10*6/MM3 (ref 3.77–5.28)
SODIUM SERPL-SCNC: 134 MMOL/L (ref 136–145)
WBC NRBC COR # BLD AUTO: 9.83 10*3/MM3 (ref 3.4–10.8)

## 2024-12-10 PROCEDURE — 36415 COLL VENOUS BLD VENIPUNCTURE: CPT

## 2024-12-10 PROCEDURE — 80053 COMPREHEN METABOLIC PANEL: CPT

## 2024-12-10 PROCEDURE — 85025 COMPLETE CBC W/AUTO DIFF WBC: CPT

## 2024-12-10 NOTE — DISCHARGE INSTRUCTIONS
Preparing for Surgery  Follow these instructions before the procedure:  Several days or weeks before your procedure  Medication(s) you need to stop   _______ days/week prior to surgery: ELIQUIS PER ORDERS      Ask your health care provider about:  Changing or stopping your regular medicines. This is especially important if you are taking diabetes medicines or blood thinners.  Taking medicines such as aspirin and ibuprofen. These medicines can thin your blood. Do not take these medicines unless your health care provider tells you to take them.  Taking over-the-counter medicines, vitamins, herbs, and supplements.    Contact your surgeon if you:  Develop a fever of more than 100.4°F (38°C) or other feelings of illness during the 48 hours before your surgery.  Have symptoms that get worse.  Have questions or concerns about your surgery.  If you are going home the same day of your surgery you will need to arrange for a responsible adult, age 18 years old or older, to drive you home from the hospital and stay with you for 24 hours. Verification of the  will be made prior to any procedure requiring sedation. You may not go home in a taxi or any form of public transportation by yourself.     Day before your procedure      24 hours before your procedure DO NOT drink alcoholic beverages or smoke.  24 hours before your procedure STOP taking Erectile Dysfunction medication (i.e.,Cialis, Viagra)   You may be asked to shower with a germ-killing soap.  Day of your procedure   You may take the following medication(s) the morning of surgery with a sip of water: METOPROLOL      8 hours before your scheduled arrival time, STOP all food, any dairy products, and full liquids. This includes hard candy, chewing gum or mints. This is extremely important to prevent serious complications.     Up to 2 hours before your scheduled arrival time, you may have clear liquids no cream, powder, or pulp of any kind. Safe options are water, black  coffee, plain tea, soda, Gatorade/Powerade, clear broth, apple juice.    2 hours before your scheduled arrival time, STOP drinking clear liquids.    You may need to take another shower with a germ-killing soap before you leave home in the morning. Do not use perfumes, colognes, or body lotions.  Wear comfortable loose-fitting clothing.  Remove all jewelry including body piercing and rings, dark colored nail polish, and make up prior to arrival at the hospital. Leave all valuables at home.   Bring your hearing aids if you rely on them.  Do not wear contact lenses. If you wear eyeglasses remember to bring a case to store them in while you are in surgery.  Do not use denture adhesives since you will be asked to remove them during your surgery.    You do not need to bring your home medications into the hospital.   Bring your sleep apnea device with you on the day of your surgery (if this applies to you).  If you have an Inspire implant for sleep apnea, please bring the remote with you on the day of surgery.  If you wear portable oxygen, bring it with you.   If you are staying overnight, you may bring a bag of items you may need such as slippers, robe and a change of clothes for your discharge. You may want to leave these items in the car until you are ready for them since your family will take your belongings when you leave the pre-operative area.  Arrive at the hospital as scheduled by the office. You will be asked to arrive 2 hours prior to your surgery time in order to prepare for your procedure.  When you arrive at the hospital  Go to the registration desk located at the main entrance of the hospital.  After registration is completed, you will be given a beeper and a sticker sheet. Take the stickers to Outpatient Surgery and place in the tray at the end of the desk to notify the staff that you have arrived and registered.   Return to the lobby to wait. You are not always called back according to the time of arrival  but rather the time your doctor will be ready.  When your beeper lights up and vibrates proceed through the double doors, under the stairs, and a member of the Outpatient Surgery staff will escort you to your preoperative room.   How to Use Chlorhexidine Before Surgery  Chlorhexidine gluconate (CHG) is a germ-killing (antiseptic) solution that is used to clean the skin. It can get rid of the bacteria that normally live on the skin and can keep them away for about 24 hours. To clean your skin with CHG, you may be given:  A CHG solution to use in the shower or as part of a sponge bath.  A prepackaged cloth that contains CHG.  Cleaning your skin with CHG may help lower the risk for infection:  While you are staying in the intensive care unit of the hospital.  If you have a vascular access, such as a central line, to provide short-term or long-term access to your veins.  If you have a catheter to drain urine from your bladder.  If you are on a ventilator. A ventilator is a machine that helps you breathe by moving air in and out of your lungs.  After surgery.  What are the risks?  Risks of using CHG include:  A skin reaction.  Hearing loss, if CHG gets in your ears and you have a perforated eardrum.  Eye injury, if CHG gets in your eyes and is not rinsed out.  The CHG product catching fire.  Make sure that you avoid smoking and flames after applying CHG to your skin.  Do not use CHG:  If you have a chlorhexidine allergy or have previously reacted to chlorhexidine.  On babies younger than 2 months of age.  How to use CHG solution  Use CHG only as told by your health care provider, and follow the instructions on the label.  Use the full amount of CHG as directed. Usually, this is one bottle.  During a shower    Follow these steps when using CHG solution during a shower (unless your health care provider gives you different instructions):  Start the shower.  Use your normal soap and shampoo to wash your face and hair.  Turn  off the shower or move out of the shower stream.  Pour the CHG onto a clean washcloth. Do not use any type of brush or rough-edged sponge.  Starting at your neck, lather your body down to your toes. Make sure you follow these instructions:  If you will be having surgery, pay special attention to the part of your body where you will be having surgery. Scrub this area for at least 1 minute.  Do not use CHG on your head or face. If the solution gets into your ears or eyes, rinse them well with water.  Avoid your genital area.  Avoid any areas of skin that have broken skin, cuts, or scrapes.  Scrub your back and under your arms. Make sure to wash skin folds.  Let the lather sit on your skin for 1-2 minutes or as long as told by your health care provider.  Thoroughly rinse your entire body in the shower. Make sure that all body creases and crevices are rinsed well.  Dry off with a clean towel. Do not put any substances on your body afterward--such as powder, lotion, or perfume--unless you are told to do so by your health care provider. Only use lotions that are recommended by the .  Put on clean clothes or pajamas.  If it is the night before your surgery, sleep in clean sheets.     During a sponge bath  Follow these steps when using CHG solution during a sponge bath (unless your health care provider gives you different instructions):  Use your normal soap and shampoo to wash your face and hair.  Pour the CHG onto a clean washcloth.  Starting at your neck, lather your body down to your toes. Make sure you follow these instructions:  If you will be having surgery, pay special attention to the part of your body where you will be having surgery. Scrub this area for at least 1 minute.  Do not use CHG on your head or face. If the solution gets into your ears or eyes, rinse them well with water.  Avoid your genital area.  Avoid any areas of skin that have broken skin, cuts, or scrapes.  Scrub your back and under  your arms. Make sure to wash skin folds.  Let the lather sit on your skin for 1-2 minutes or as long as told by your health care provider.  Using a different clean, wet washcloth, thoroughly rinse your entire body. Make sure that all body creases and crevices are rinsed well.  Dry off with a clean towel. Do not put any substances on your body afterward--such as powder, lotion, or perfume--unless you are told to do so by your health care provider. Only use lotions that are recommended by the .  Put on clean clothes or pajamas.  If it is the night before your surgery, sleep in clean sheets.  How to use CHG prepackaged cloths  Only use CHG cloths as told by your health care provider, and follow the instructions on the label.  Use the CHG cloth on clean, dry skin.  Do not use the CHG cloth on your head or face unless your health care provider tells you to.  When washing with the CHG cloth:  Avoid your genital area.  Avoid any areas of skin that have broken skin, cuts, or scrapes.  Before surgery    Follow these steps when using a CHG cloth to clean before surgery (unless your health care provider gives you different instructions):  Using the CHG cloth, vigorously scrub the part of your body where you will be having surgery. Scrub using a back-and-forth motion for 3 minutes. The area on your body should be completely wet with CHG when you are done scrubbing.  Do not rinse. Discard the cloth and let the area air-dry. Do not put any substances on the area afterward, such as powder, lotion, or perfume.  Put on clean clothes or pajamas.  If it is the night before your surgery, sleep in clean sheets.     For general bathing  Follow these steps when using CHG cloths for general bathing (unless your health care provider gives you different instructions).  Use a separate CHG cloth for each area of your body. Make sure you wash between any folds of skin and between your fingers and toes. Wash your body in the  following order, switching to a new cloth after each step:  The front of your neck, shoulders, and chest.  Both of your arms, under your arms, and your hands.  Your stomach and groin area, avoiding the genitals.  Your right leg and foot.  Your left leg and foot.  The back of your neck, your back, and your buttocks.  Do not rinse. Discard the cloth and let the area air-dry. Do not put any substances on your body afterward--such as powder, lotion, or perfume--unless you are told to do so by your health care provider. Only use lotions that are recommended by the .  Put on clean clothes or pajamas.  Contact a health care provider if:  Your skin gets irritated after scrubbing.  You have questions about using your solution or cloth.  You swallow any chlorhexidine. Call your local poison control center (1-136.367.1706 in the [place]  Get help right away if:  Your eyes itch badly, or they become very red or swollen.  Your skin itches badly and is red or swollen.  Your hearing changes.  You have trouble seeing.  You have swelling or tingling in your mouth or throat.  You have trouble breathing.  These symptoms may represent a serious problem that is an emergency. Do not wait to see if the symptoms will go away. Get medical help right away. Call your local emergency services (920 in the [place] Do not drive yourself to the hospital.  Summary  Chlorhexidine gluconate (CHG) is a germ-killing (antiseptic) solution that is used to clean the skin. Cleaning your skin with CHG may help to lower your risk for infection.  You may be given CHG to use for bathing. It may be in a bottle or in a prepackaged cloth to use on your skin. Carefully follow your health care provider's instructions and the instructions on the product label.  Do not use CHG if you have a chlorhexidine allergy.  Contact your health care provider if your skin gets irritated after scrubbing.  This information is not intended to replace advice given to you  by your health care provider. Make sure you discuss any questions you have with your health care provider.  Document Revised: 04/17/2023 Document Reviewed: 02/28/2022  Elsevier Patient Education © 2023 [employer]

## 2024-12-16 ENCOUNTER — TELEPHONE (OUTPATIENT)
Dept: SURGERY | Facility: CLINIC | Age: 79
End: 2024-12-16
Payer: MEDICARE

## 2024-12-16 NOTE — TELEPHONE ENCOUNTER
Called Kajal to confirm her surgery date 12/17/2024 with an arrival time of 10:30AM.   Reminded her not to eat or drink after midnight.   Let her know to come through the main entrance of the hospital and check in at main registration.      Confirmed with patient.    
return to ED if symptoms worsen, persist or questions arise/need for outpatient follow-up

## 2024-12-17 ENCOUNTER — ANESTHESIA EVENT (OUTPATIENT)
Dept: PERIOP | Facility: HOSPITAL | Age: 79
End: 2024-12-17
Payer: MEDICARE

## 2024-12-17 ENCOUNTER — ANESTHESIA (OUTPATIENT)
Dept: PERIOP | Facility: HOSPITAL | Age: 79
End: 2024-12-17
Payer: MEDICARE

## 2024-12-17 ENCOUNTER — HOSPITAL ENCOUNTER (OUTPATIENT)
Facility: HOSPITAL | Age: 79
Setting detail: HOSPITAL OUTPATIENT SURGERY
Discharge: HOME | End: 2024-12-17
Attending: STUDENT IN AN ORGANIZED HEALTH CARE EDUCATION/TRAINING PROGRAM | Admitting: STUDENT IN AN ORGANIZED HEALTH CARE EDUCATION/TRAINING PROGRAM
Payer: MEDICARE

## 2024-12-17 VITALS
HEIGHT: 63 IN | BODY MASS INDEX: 23.09 KG/M2 | WEIGHT: 130.29 LBS | OXYGEN SATURATION: 92 % | SYSTOLIC BLOOD PRESSURE: 134 MMHG | RESPIRATION RATE: 16 BRPM | DIASTOLIC BLOOD PRESSURE: 56 MMHG | HEART RATE: 51 BPM | TEMPERATURE: 97.8 F

## 2024-12-17 DIAGNOSIS — K81.1 CHRONIC CHOLECYSTITIS: ICD-10-CM

## 2024-12-17 DIAGNOSIS — R10.11 ABDOMINAL PAIN, RIGHT UPPER QUADRANT: ICD-10-CM

## 2024-12-17 LAB
GLUCOSE BLDC GLUCOMTR-MCNC: 181 MG/DL (ref 70–130)
GLUCOSE BLDC GLUCOMTR-MCNC: 198 MG/DL (ref 70–130)

## 2024-12-17 PROCEDURE — 25010000002 HEPARIN (PORCINE) PER 1000 UNITS: Performed by: STUDENT IN AN ORGANIZED HEALTH CARE EDUCATION/TRAINING PROGRAM

## 2024-12-17 PROCEDURE — 25010000002 LIDOCAINE PF 2% 2 % SOLUTION: Performed by: NURSE ANESTHETIST, CERTIFIED REGISTERED

## 2024-12-17 PROCEDURE — S2900 ROBOTIC SURGICAL SYSTEM: HCPCS | Performed by: STUDENT IN AN ORGANIZED HEALTH CARE EDUCATION/TRAINING PROGRAM

## 2024-12-17 PROCEDURE — 25010000002 MORPHINE SULFATE (PF) 2 MG/ML SOLUTION 1 ML CARTRIDGE: Performed by: STUDENT IN AN ORGANIZED HEALTH CARE EDUCATION/TRAINING PROGRAM

## 2024-12-17 PROCEDURE — 25010000002 CEFAZOLIN PER 500 MG: Performed by: STUDENT IN AN ORGANIZED HEALTH CARE EDUCATION/TRAINING PROGRAM

## 2024-12-17 PROCEDURE — 25010000002 PROPOFOL 10 MG/ML EMULSION: Performed by: NURSE ANESTHETIST, CERTIFIED REGISTERED

## 2024-12-17 PROCEDURE — S0260 H&P FOR SURGERY: HCPCS | Performed by: STUDENT IN AN ORGANIZED HEALTH CARE EDUCATION/TRAINING PROGRAM

## 2024-12-17 PROCEDURE — 25010000002 LIDOCAINE 1 % SOLUTION 20 ML VIAL: Performed by: STUDENT IN AN ORGANIZED HEALTH CARE EDUCATION/TRAINING PROGRAM

## 2024-12-17 PROCEDURE — 25010000002 DEXAMETHASONE PER 1 MG: Performed by: STUDENT IN AN ORGANIZED HEALTH CARE EDUCATION/TRAINING PROGRAM

## 2024-12-17 PROCEDURE — 88304 TISSUE EXAM BY PATHOLOGIST: CPT | Performed by: STUDENT IN AN ORGANIZED HEALTH CARE EDUCATION/TRAINING PROGRAM

## 2024-12-17 PROCEDURE — 25010000002 FENTANYL CITRATE (PF) 100 MCG/2ML SOLUTION: Performed by: NURSE ANESTHETIST, CERTIFIED REGISTERED

## 2024-12-17 PROCEDURE — 25010000002 BUPIVACAINE 0.5 % SOLUTION 50 ML VIAL: Performed by: STUDENT IN AN ORGANIZED HEALTH CARE EDUCATION/TRAINING PROGRAM

## 2024-12-17 PROCEDURE — 25010000002 BUPIVACAINE (PF) 0.25 % SOLUTION 30 ML VIAL: Performed by: STUDENT IN AN ORGANIZED HEALTH CARE EDUCATION/TRAINING PROGRAM

## 2024-12-17 PROCEDURE — 25010000002 INDOCYANINE GREEN 25 MG RECONSTITUTED SOLUTION: Performed by: STUDENT IN AN ORGANIZED HEALTH CARE EDUCATION/TRAINING PROGRAM

## 2024-12-17 PROCEDURE — 25010000002 LIDOCAINE 1% - EPINEPHRINE 1:100000 1 %-1:100000 SOLUTION 20 ML VIAL: Performed by: STUDENT IN AN ORGANIZED HEALTH CARE EDUCATION/TRAINING PROGRAM

## 2024-12-17 PROCEDURE — 82948 REAGENT STRIP/BLOOD GLUCOSE: CPT

## 2024-12-17 PROCEDURE — 25010000002 ONDANSETRON PER 1 MG: Performed by: NURSE ANESTHETIST, CERTIFIED REGISTERED

## 2024-12-17 PROCEDURE — 47563 LAPARO CHOLECYSTECTOMY/GRAPH: CPT | Performed by: STUDENT IN AN ORGANIZED HEALTH CARE EDUCATION/TRAINING PROGRAM

## 2024-12-17 PROCEDURE — 25810000003 LACTATED RINGERS PER 1000 ML: Performed by: STUDENT IN AN ORGANIZED HEALTH CARE EDUCATION/TRAINING PROGRAM

## 2024-12-17 PROCEDURE — 25010000002 SUGAMMADEX 200 MG/2ML SOLUTION: Performed by: NURSE ANESTHETIST, CERTIFIED REGISTERED

## 2024-12-17 DEVICE — LARGE LIGATION CLIPS 6 CLIPS/CART
Type: IMPLANTABLE DEVICE | Site: ABDOMEN | Status: FUNCTIONAL
Brand: VAS-Q-CLIP

## 2024-12-17 RX ORDER — LIDOCAINE HYDROCHLORIDE 20 MG/ML
INJECTION, SOLUTION EPIDURAL; INFILTRATION; INTRACAUDAL; PERINEURAL AS NEEDED
Status: DISCONTINUED | OUTPATIENT
Start: 2024-12-17 | End: 2024-12-17 | Stop reason: SURG

## 2024-12-17 RX ORDER — SODIUM CHLORIDE 0.9 % (FLUSH) 0.9 %
10 SYRINGE (ML) INJECTION EVERY 12 HOURS SCHEDULED
Status: DISCONTINUED | OUTPATIENT
Start: 2024-12-17 | End: 2024-12-17 | Stop reason: HOSPADM

## 2024-12-17 RX ORDER — ROCURONIUM BROMIDE 10 MG/ML
INJECTION, SOLUTION INTRAVENOUS AS NEEDED
Status: DISCONTINUED | OUTPATIENT
Start: 2024-12-17 | End: 2024-12-17 | Stop reason: SURG

## 2024-12-17 RX ORDER — HEPARIN SODIUM 5000 [USP'U]/ML
5000 INJECTION, SOLUTION INTRAVENOUS; SUBCUTANEOUS EVERY 8 HOURS SCHEDULED
Status: DISCONTINUED | OUTPATIENT
Start: 2024-12-17 | End: 2024-12-17 | Stop reason: HOSPADM

## 2024-12-17 RX ORDER — FENTANYL CITRATE 50 UG/ML
INJECTION, SOLUTION INTRAMUSCULAR; INTRAVENOUS AS NEEDED
Status: DISCONTINUED | OUTPATIENT
Start: 2024-12-17 | End: 2024-12-17 | Stop reason: SURG

## 2024-12-17 RX ORDER — LIDOCAINE HYDROCHLORIDE 10 MG/ML
0.5 INJECTION, SOLUTION EPIDURAL; INFILTRATION; INTRACAUDAL; PERINEURAL ONCE AS NEEDED
Status: DISCONTINUED | OUTPATIENT
Start: 2024-12-17 | End: 2024-12-17 | Stop reason: HOSPADM

## 2024-12-17 RX ORDER — SODIUM CHLORIDE 0.9 % (FLUSH) 0.9 %
3 SYRINGE (ML) INJECTION AS NEEDED
Status: DISCONTINUED | OUTPATIENT
Start: 2024-12-17 | End: 2024-12-17 | Stop reason: HOSPADM

## 2024-12-17 RX ORDER — HYDROCODONE BITARTRATE AND ACETAMINOPHEN 10; 325 MG/1; MG/1
1 TABLET ORAL EVERY 4 HOURS PRN
Status: DISCONTINUED | OUTPATIENT
Start: 2024-12-17 | End: 2024-12-17 | Stop reason: HOSPADM

## 2024-12-17 RX ORDER — FENTANYL CITRATE 50 UG/ML
25 INJECTION, SOLUTION INTRAMUSCULAR; INTRAVENOUS
Status: DISCONTINUED | OUTPATIENT
Start: 2024-12-17 | End: 2024-12-17 | Stop reason: HOSPADM

## 2024-12-17 RX ORDER — ONDANSETRON 4 MG/1
4 TABLET, FILM COATED ORAL EVERY 8 HOURS PRN
Qty: 15 TABLET | Refills: 0 | Status: SHIPPED | OUTPATIENT
Start: 2024-12-17 | End: 2025-12-17

## 2024-12-17 RX ORDER — MAGNESIUM HYDROXIDE 1200 MG/15ML
LIQUID ORAL AS NEEDED
Status: DISCONTINUED | OUTPATIENT
Start: 2024-12-17 | End: 2024-12-17 | Stop reason: HOSPADM

## 2024-12-17 RX ORDER — LABETALOL HYDROCHLORIDE 5 MG/ML
5 INJECTION, SOLUTION INTRAVENOUS
Status: DISCONTINUED | OUTPATIENT
Start: 2024-12-17 | End: 2024-12-17 | Stop reason: HOSPADM

## 2024-12-17 RX ORDER — NALOXONE HCL 0.4 MG/ML
0.4 VIAL (ML) INJECTION AS NEEDED
Status: DISCONTINUED | OUTPATIENT
Start: 2024-12-17 | End: 2024-12-17 | Stop reason: HOSPADM

## 2024-12-17 RX ORDER — FENTANYL CITRATE 50 UG/ML
50 INJECTION, SOLUTION INTRAMUSCULAR; INTRAVENOUS
Status: DISCONTINUED | OUTPATIENT
Start: 2024-12-17 | End: 2024-12-17 | Stop reason: HOSPADM

## 2024-12-17 RX ORDER — INDOCYANINE GREEN AND WATER 25 MG
3.75 KIT INJECTION ONCE
Status: COMPLETED | OUTPATIENT
Start: 2024-12-17 | End: 2024-12-17

## 2024-12-17 RX ORDER — HYDROCODONE BITARTRATE AND ACETAMINOPHEN 5; 325 MG/1; MG/1
1 TABLET ORAL EVERY 4 HOURS PRN
Status: DISCONTINUED | OUTPATIENT
Start: 2024-12-17 | End: 2024-12-17 | Stop reason: HOSPADM

## 2024-12-17 RX ORDER — OXYCODONE HYDROCHLORIDE 5 MG/1
5 TABLET ORAL EVERY 8 HOURS PRN
Qty: 10 TABLET | Refills: 0 | Status: SHIPPED | OUTPATIENT
Start: 2024-12-17 | End: 2025-12-17

## 2024-12-17 RX ORDER — PROPOFOL 10 MG/ML
VIAL (ML) INTRAVENOUS AS NEEDED
Status: DISCONTINUED | OUTPATIENT
Start: 2024-12-17 | End: 2024-12-17 | Stop reason: SURG

## 2024-12-17 RX ORDER — ONDANSETRON 2 MG/ML
4 INJECTION INTRAMUSCULAR; INTRAVENOUS ONCE AS NEEDED
Status: DISCONTINUED | OUTPATIENT
Start: 2024-12-17 | End: 2024-12-17 | Stop reason: HOSPADM

## 2024-12-17 RX ORDER — DEXTROSE MONOHYDRATE 25 G/50ML
12.5 INJECTION, SOLUTION INTRAVENOUS AS NEEDED
Status: DISCONTINUED | OUTPATIENT
Start: 2024-12-17 | End: 2024-12-17 | Stop reason: HOSPADM

## 2024-12-17 RX ORDER — SODIUM CHLORIDE 0.9 % (FLUSH) 0.9 %
10 SYRINGE (ML) INJECTION AS NEEDED
Status: DISCONTINUED | OUTPATIENT
Start: 2024-12-17 | End: 2024-12-17 | Stop reason: HOSPADM

## 2024-12-17 RX ORDER — IBUPROFEN 600 MG/1
600 TABLET, FILM COATED ORAL EVERY 6 HOURS PRN
Status: DISCONTINUED | OUTPATIENT
Start: 2024-12-17 | End: 2024-12-17 | Stop reason: HOSPADM

## 2024-12-17 RX ORDER — SODIUM CHLORIDE, SODIUM LACTATE, POTASSIUM CHLORIDE, CALCIUM CHLORIDE 600; 310; 30; 20 MG/100ML; MG/100ML; MG/100ML; MG/100ML
1000 INJECTION, SOLUTION INTRAVENOUS CONTINUOUS
Status: DISCONTINUED | OUTPATIENT
Start: 2024-12-17 | End: 2024-12-17 | Stop reason: HOSPADM

## 2024-12-17 RX ORDER — FLUMAZENIL 0.1 MG/ML
0.2 INJECTION INTRAVENOUS AS NEEDED
Status: DISCONTINUED | OUTPATIENT
Start: 2024-12-17 | End: 2024-12-17 | Stop reason: HOSPADM

## 2024-12-17 RX ORDER — ACETAMINOPHEN 325 MG/1
975 TABLET ORAL EVERY 8 HOURS
Start: 2024-12-17 | End: 2025-12-17

## 2024-12-17 RX ORDER — ONDANSETRON 2 MG/ML
INJECTION INTRAMUSCULAR; INTRAVENOUS AS NEEDED
Status: DISCONTINUED | OUTPATIENT
Start: 2024-12-17 | End: 2024-12-17 | Stop reason: SURG

## 2024-12-17 RX ADMIN — PROPOFOL 80 MG: 10 INJECTION, EMULSION INTRAVENOUS at 12:05

## 2024-12-17 RX ADMIN — FENTANYL CITRATE 50 MCG: 50 INJECTION, SOLUTION INTRAMUSCULAR; INTRAVENOUS at 12:40

## 2024-12-17 RX ADMIN — SUGAMMADEX 200 MG: 100 INJECTION, SOLUTION INTRAVENOUS at 12:50

## 2024-12-17 RX ADMIN — LIDOCAINE HYDROCHLORIDE 50 MG: 20 INJECTION, SOLUTION EPIDURAL; INFILTRATION; INTRACAUDAL; PERINEURAL at 12:03

## 2024-12-17 RX ADMIN — ROCURONIUM BROMIDE 50 MG: 10 INJECTION, SOLUTION INTRAVENOUS at 12:05

## 2024-12-17 RX ADMIN — INDOCYANINE GREEN AND WATER 3.75 MG: KIT at 10:58

## 2024-12-17 RX ADMIN — FENTANYL CITRATE 100 MCG: 50 INJECTION, SOLUTION INTRAMUSCULAR; INTRAVENOUS at 12:02

## 2024-12-17 RX ADMIN — SODIUM CHLORIDE, POTASSIUM CHLORIDE, SODIUM LACTATE AND CALCIUM CHLORIDE 1000 ML: 600; 310; 30; 20 INJECTION, SOLUTION INTRAVENOUS at 10:40

## 2024-12-17 RX ADMIN — CEFAZOLIN 2 G: 2 INJECTION, POWDER, FOR SOLUTION INTRAMUSCULAR; INTRAVENOUS at 12:08

## 2024-12-17 RX ADMIN — HEPARIN SODIUM 5000 UNITS: 5000 INJECTION, SOLUTION INTRAVENOUS; SUBCUTANEOUS at 10:58

## 2024-12-17 RX ADMIN — ONDANSETRON 4 MG: 2 INJECTION INTRAMUSCULAR; INTRAVENOUS at 12:44

## 2024-12-17 RX ADMIN — FENTANYL CITRATE 50 MCG: 50 INJECTION, SOLUTION INTRAMUSCULAR; INTRAVENOUS at 12:25

## 2024-12-17 NOTE — ANESTHESIA PREPROCEDURE EVALUATION
Anesthesia Evaluation     Patient summary reviewed   no history of anesthetic complications:   NPO Solid Status: > 8 hours             Airway   Mallampati: II  Dental    (+) upper dentures and lower dentures    Pulmonary    (-) COPD, asthma, sleep apnea, not a smoker  Cardiovascular   Exercise tolerance: good (4-7 METS)    (+) hypertension, CAD (medical management), dysrhythmias Atrial Fib, CHF Systolic <55%  (-) pacemaker, past MI, angina, cardiac stents      Neuro/Psych  (-) seizures, TIA, CVA  GI/Hepatic/Renal/Endo    (+) renal disease- CRI, diabetes mellitus using insulin, thyroid problem hypothyroidism  (-) GERD, liver disease    Musculoskeletal     Abdominal    Substance History      OB/GYN          Other                      Anesthesia Plan    ASA 3     general     intravenous induction     Anesthetic plan, risks, benefits, and alternatives have been provided, discussed and informed consent has been obtained with: patient.    CODE STATUS:

## 2024-12-17 NOTE — H&P
Mercedes Hutson MD - General Surgery History and Physical     Referring Provider: Mercedes Hutson MD    Patient Care Team:  Germaine Richard MD as PCP - General (Family Medicine)  Susana Askew APRN as Nurse Practitioner (Family Medicine)  Trae Alvarez MD as Consulting Physician (Otolaryngology)    Chief complaint gallbladder    Subjective .     History of present illness:  The patient is a 79 y.o. female who presents for robotic cholecystectomy. She was previously scheduled however she was noted to have an acute kidney injury and required admission for work up. She has since been cleared to proceed with cholecystectomy     Review of Systems    Review of Systems - General ROS: negative  ENT ROS: negative  Respiratory ROS: no cough, shortness of breath, or wheezing  Cardiovascular ROS: no chest pain or dyspnea on exertion  Gastrointestinal ROS: positive for - abdominal pain and nausea/vomiting  Genito-Urinary ROS: no dysuria, trouble voiding, or hematuria  Dermatological ROS: negative   Breast ROS: negative for breast lumps  Hematological and Lymphatic ROS: negative  Musculoskeletal ROS: negative   Neurological ROS: no TIA or stroke symptoms    Psychological ROS: negative  Endocrine ROS: negative    History  Past Medical History:   Diagnosis Date    Atrial fibrillation     CHF (congestive heart failure)     COPD (chronic obstructive pulmonary disease)     Coronary artery disease     Diabetes     Heart attack     x 3    Macular degeneration     Melanoma     Thyroid disorder    ,   Past Surgical History:   Procedure Laterality Date    CARDIAC CATHETERIZATION Bilateral 06/22/2020    Procedure: Coronary angiography;  Surgeon: Chao Álvarez MD;  Location:  [facility];  Service: Cardiology;  Laterality: Bilateral;    CARDIAC CATHETERIZATION N/A 06/22/2020    Procedure: Left ventriculography;  Surgeon: Chao Álvarez MD;  Location:  [facility];  Service: Cardiology;   Laterality: N/A;    CARDIAC CATHETERIZATION N/A 2020    Procedure: Left Heart Cath;  Surgeon: Chao Álvarez MD;  Location:  [facility];  Service: Cardiology;  Laterality: N/A;    CARDIAC CATHETERIZATION      x 4    CORONARY ANGIOPLASTY      HYSTERECTOMY      TONSILLECTOMY     ,   Family History   Problem Relation Age of Onset    Diabetes Mother    ,   Social History     Tobacco Use    Smoking status: Former     Current packs/day: 0.00     Types: Cigarettes     Quit date: 3/6/2003     Years since quittin.8     Passive exposure: Past    Smokeless tobacco: Never   Vaping Use    Vaping status: Never Used   Substance Use Topics    Alcohol use: No    Drug use: No   ,   Medications Prior to Admission   Medication Sig Dispense Refill Last Dose/Taking    amiodarone (PACERONE) 100 MG tablet Take 1 tablet by mouth Daily.   2024 at  9:00 AM    apixaban (ELIQUIS) 5 MG tablet tablet Take 1 tablet by mouth 2 (Two) Times a Day.   12/15/2024    aspirin 81 MG EC tablet Take 1 tablet by mouth Daily.   2024 at  9:00 AM    atorvastatin (LIPITOR) 80 MG tablet Take 1 tablet by mouth Daily.   2024 at  7:00 PM    cholecalciferol (VITAMIN D3) 25 MCG (1000 UT) tablet Take 1 tablet by mouth Daily.   2024 at  9:00 AM    glipizide (GLUCOTROL XL) 10 MG 24 hr tablet Take 1 tablet by mouth Daily.   2024 at  9:00 AM    insulin NPH-insulin regular (NovoLIN 70/30 ReliOn) (70-30) 100 UNIT/ML injection Inject 10 Units under the skin into the appropriate area as directed Every Night.   2024 at  9:00 AM    levothyroxine (SYNTHROID, LEVOTHROID) 112 MCG tablet Take 0.5 tablets by mouth Daily.   2024 at  9:00 AM    metoprolol succinate XL (TOPROL-XL) 50 MG 24 hr tablet Take 1 tablet by mouth Daily.   2024 at  9:00 AM    Multiple Vitamins-Minerals (MULTIVITAMIN WITH MINERALS) tablet tablet Take 1 tablet by mouth Daily.   12/15/2024    NOVOLIN 70/30 RELION (70-30) 100 UNIT/ML  injection Inject 30 Units under the skin into the appropriate area as directed Daily.   Taking    O2 (OXYGEN) Inhale 2 L/min As Needed (soa).   Taking As Needed    and Allergies:  Patient has no known allergies.    Current Facility-Administered Medications:     ceFAZolin 2000 mg IVPB in 100 mL NS (MBP), 2,000 mg, Intravenous, Once, Mercedes Hutson MD    dextrose (D50W) (25 g/50 mL) IV injection 12.5 g, 12.5 g, Intravenous, PRN, Chaparro Chávez MD    fentaNYL citrate (PF) (SUBLIMAZE) injection 25 mcg, 25 mcg, Intravenous, Q5 Min PRN, Chaparro Chávez MD    heparin (porcine) 5000 UNIT/ML injection 5,000 Units, 5,000 Units, Subcutaneous, Q8H, Mercedes Hutson MD, 5,000 Units at 12/17/24 1058    lactated ringers infusion 1,000 mL, 1,000 mL, Intravenous, Continuous, Mercedes Hutson MD, Last Rate: 25 mL/hr at 12/17/24 1040, 1,000 mL at 12/17/24 1040    lidocaine PF 1% (XYLOCAINE) injection 0.5 mL, 0.5 mL, Intradermal, Once PRN, Mercedes Hutson MD    sodium chloride 0.9 % flush 10 mL, 10 mL, Intravenous, Q12H, Chaparro Chávez MD    sodium chloride 0.9 % flush 10 mL, 10 mL, Intravenous, PRN, Chaparro Chávez MD    sodium chloride 0.9 % flush 3 mL, 3 mL, Intravenous, PRN, Mercedes Hutson MD    Objective     Vital Signs   Temp:  [97.4 °F (36.3 °C)] 97.4 °F (36.3 °C)  Heart Rate:  [52-55] 52  Resp:  [18] 18  BP: (137)/(64) 137/64    Physical Exam:  General appearance - alert, well appearing, and in no distress  Mental status - alert, oriented to person, place, and time  Eyes - pupils equal and reactive, extraocular eye movements intact  Neck - supple, no significant adenopathy  Abdomen - soft, nontender, nondistended, no masses or organomegaly  Neurological - alert, oriented, normal speech, no focal findings or movement disorder noted    Results Review:     Lab Results (last 24 hours)       Procedure Component Value Units Date/Time    POC Glucose Once  [[id number]]  (Abnormal) Collected: 12/17/24 1041    Specimen: Blood Updated: 12/17/24 1055     Glucose 181 mg/dL      Comment: : [id number] Jad Maloney ID: [id number]             Imaging Results (Last 24 Hours)       ** No results found for the last 24 hours. **              Assessment & Plan       Kajal Huston is a 79 y.o. female with chronic cholecystitis. History and imaging above are consistent with this diagnosis. I did  the patient that there is a small chance that cholecystectomy does not completely resolve the pain, and that if this is the case, we will refer the patient to GI for an EGD. However, the symptoms including their nature, timing and duration are most consistent with biliary colic. I reviewed the gallbladder anatomy with the patient, and after a thorough discussion of risks (including bleeding, infection, bile leak, damage to surrounding structures including the common bile duct, and risks of anesthesia) and benefits, the patient wishes to proceed with laparoscopic robotic assisted cholecystectomy, possible cholangiogram.       Mercedes Hutson MD  12/17/24  11:45 CST

## 2024-12-17 NOTE — DISCHARGE INSTRUCTIONS
Wound:   - you have skin glue on your incisions. Okay to shower tomorrow.   - Leave skin glue in place, it should slowly fall off over 2 weeks   - No swimming/soaking/bathing x 2 weeks to allow incisions to heal.     Activity:   - Activity as tolerated. NO heavy lifting x 2 weeks - no more than 25lb at a time.   - No driving or operating machinery on narcotic pain medication.     Pain medication:   - Take 1000mg of tylenol every 8 hours for 3 days. After three days, take it prn.   - You have a prescription for a narcotic. It will be roxicodone 5mg tabs. Take these only as needed after you have taken the tylenol. If you are taking the roxicodone, make sure to take a stool softener (colace) with it as it can cause constipation.   - The narcotic may make you nauseated, you will have a prescription for zofran in case of nausea.     Follow up:   - make an appointment to see Becky Girard PA-C  in 2 weeks  - If you have any concerns before then, call me office at [phone]

## 2024-12-17 NOTE — ANESTHESIA POSTPROCEDURE EVALUATION
"Patient: Kajal Huston    Procedure Summary       Date: 12/17/24 Room / Location:  [facility] OR 06 /  [facility] OR    Anesthesia Start: 1200 Anesthesia Stop: 1301    Procedure: LAPAROSCOPIC ROBOTIC ASSISTED CHOLECYSTECTOMY WTIH PRE-OPERATIVE INDOCYANINE GREEN INJECTION (Abdomen) Diagnosis:       Abdominal pain, right upper quadrant      Chronic cholecystitis      (Abdominal pain, right upper quadrant [R10.11])      (Chronic cholecystitis [K81.1])    Surgeons: Mercedes Hutson MD Provider: Flavio Jackson CRNA    Anesthesia Type: general ASA Status: 3            Anesthesia Type: general    Vitals  Vitals Value Taken Time   /64 12/17/24 1315   Temp 97.8 °F (36.6 °C) 12/17/24 1315   Pulse 59 12/17/24 1320   Resp 14 12/17/24 1320   SpO2 93 % 12/17/24 1320           Post Anesthesia Care and Evaluation    PONV Status: none  Comments: Patient d/c from PACU prior to anes eval based on Savana score.  Please see RN notes for details of d/c criteria.    Blood pressure 141/56, pulse 51, temperature 97.8 °F (36.6 °C), temperature source Temporal, resp. rate 16, height 160 cm (62.99\"), weight 59.1 kg (130 lb 4.7 oz), SpO2 (!) 89%, not currently breastfeeding.        "

## 2024-12-17 NOTE — ANESTHESIA PROCEDURE NOTES
Airway  Urgency: elective    Date/Time: 12/17/2024 12:08 PM  Airway not difficult    General Information and Staff    Patient location during procedure: OR  CRNA/CAA: Flavio Jackson CRNA    Indications and Patient Condition  Indications for airway management: airway protection    Preoxygenated: yes  Mask difficulty assessment: 1 - vent by mask    Final Airway Details  Final airway type: endotracheal airway      Successful airway: ETT  Cuffed: yes   Successful intubation technique: direct laryngoscopy  Blade: Parker  Blade size: 2  ETT size (mm): 7.5  Cormack-Lehane Classification: grade IIa - partial view of glottis  Placement verified by: chest auscultation and capnometry   Cuff volume (mL): 6  Measured from: gums  ETT/EBT to gums (cm): 22  Number of attempts at approach: 1  Assessment: lips, teeth, and gum same as pre-op and atraumatic intubation    Additional Comments  Airway management per Tere OHARA

## 2024-12-17 NOTE — OP NOTE
Laparoscopic Robotic-Assisted Cholecystectomy with ICG-guided Cholangiogram Operative Report:     Patient: Kajal Huston MRN: [id number]    YOB: 1945  Age: 79 y.o.  Sex: female   Unit:  PAD OR Room/Bed: PAD OR/MAIN OR Location: [facility]    Admitting Physician: NOHELIA SANTIAGO    Primary Care Physician: Germaine Richard MD             INDICATIONS: This is a 79 y.o. female who presents with chronic cholecystitis as indication for laparoscopic cholecystectomy. After a discussion of risks, benefits and alternatives, consent was obtained.     DATE OF OPERATION: 12/17/2024     Surgeons and Role:     * Nohelia Santiago MD - Primary  Becky Girard PA-C - assist     ANESTHESIA: General     PREOPERATIVE DIAGNOSIS: Abdominal pain, right upper quadrant [R10.11]  Chronic cholecystitis [K81.1]    POSTOPERATIVE DIAGNOSIS: Same    PROCEDURES PERFORMED:  Laparoscopic robotic-assisted cholecystectomy with ICG-guided cholangiogram without fluoroscopy     PROCEDURE DETAILS:   Preoperative ICG, antibiotics and DVT PPX were given. The patient was brought into the OR and placed in the supine position.  General anesthesia was induced.  The patient's abdomen was prepped and draped in the usual sterile fashion.  A timeout was performed verifying the patient and the procedure. An 8 mm incision was made along the left subcostal margin and a Veress needle inserted.  The abdomen was inflated to 15 mmHg with CO2 gas. An 8 mm trocar was placed followed by the robotic camera.  A laparoscopic TAP block was performed with my deep local. Three additional 8 mm trocars were placed in an oblique line from left upper quadrant to right lower quadrant.  The patient was placed in slight reversed Trendelenburg position with right side up. The robot was docked. With the tip up, ProGrasp and hook, the head of the gallbladder was grasped and retracted over the dome of the liver.  The infundibulum was also grasped and retracted  to the patient's right side, opening up the triangle of Calot. The hook was used to open the fat overlying the neck of the gallbladder. The cystic artery and cystic duct were clearly visualized. The critical view of safety was obtained and the confluence of the common bile duct to the common hepatic and cystic ducts was visualized.  The Firefly view was turned on and the anatomy was confirmed with the preoperatively injected ICG.  The cystic duct was double clipped proximally, single clipped distally, and divided. The cystic artery was single clipped proximally, single clipped distally and divided. The gallbladder was removed from the undersurface of the liver with electrocautery.  The gallbladder fossa demonstrated no signs of bleeding or leakage of bile. The robot was undocked.  The left subcostal trocar was removed and the Endo Catch bag was inserted through this incision. The gallbladder was placed in an Endo Catch bag and removed. The abdomen was desufflated and trocars removed. The extraction trocar site fascia was closed with an 0 Vicryl on a UR6.  The skin was closed with 4-0 Monocryl followed by skin glue.  The patient tolerated the procedure well, was extubated in the OR and transferred to the PACU in stable condition    Becky Girard PA-C was responsible for performing the following activities: Retraction, Suturing, Closing, Placing Dressing, and exchanging robotic instruments  and their skilled assistance was necessary for the success of this case.    Findings: distended gallbladder   Estimated Blood Loss:  20 mL   Complications: none apparent           Specimens: Gallbladder and contents     Disposition: PACU - hemodynamically stable.           Condition: stable    Mercedes Hutson MD  12/17/24

## 2024-12-18 LAB
LAB AP CASE REPORT: NORMAL
Lab: NORMAL
PATH REPORT.FINAL DX SPEC: NORMAL
PATH REPORT.GROSS SPEC: NORMAL

## 2024-12-19 ENCOUNTER — TELEPHONE (OUTPATIENT)
Dept: SURGERY | Facility: CLINIC | Age: 79
End: 2024-12-19
Payer: MEDICARE

## 2024-12-19 NOTE — TELEPHONE ENCOUNTER
Post OP phone call visit:    Type of surgery: Laparoscopic robotic assisted cholecystectomy.   How are you feeling? Doing fine.   Are you having any pain or Nausea? Sore. No nausea.   Do you have a normal appetite? Yes.   Are you passing gas or having any BM? Having BM's.   How is your activity? Okay.   Any drainage or fever? No redness. No drainage. No fever.

## 2025-01-02 ENCOUNTER — OFFICE VISIT (OUTPATIENT)
Dept: SURGERY | Facility: CLINIC | Age: 80
End: 2025-01-02
Payer: MEDICARE

## 2025-01-02 VITALS
BODY MASS INDEX: 23.04 KG/M2 | WEIGHT: 130 LBS | DIASTOLIC BLOOD PRESSURE: 56 MMHG | OXYGEN SATURATION: 84 % | SYSTOLIC BLOOD PRESSURE: 137 MMHG | HEIGHT: 63 IN | HEART RATE: 55 BPM

## 2025-01-02 DIAGNOSIS — K81.1 CHRONIC CHOLECYSTITIS: ICD-10-CM

## 2025-01-02 DIAGNOSIS — Z90.49 S/P LAPAROSCOPIC CHOLECYSTECTOMY: Primary | ICD-10-CM

## 2025-01-02 NOTE — PROGRESS NOTES
"Patient: Kajal Huston    YOB: 1945    Date: 01/02/2025    Primary Care Provider: Germaine Richard MD    Vital Signs:   Vitals:    01/02/25 1034   BP: 137/56   Pulse: 55   SpO2: (!) 84%   Weight: 59 kg (130 lb)   Height: 160 cm (62.99\")       Overall doing well s/p robotic assisted laparoscopic cholecystectomy by Dr. Hutson on 12/17/24. No fevers. Symptoms improved. Tolerating diet. Energy improving. Pain improving. Bowels moving ok. Sclera anicteric. Wounds healing well. No significant abdominal tenderness on exam. No evidence of jaundice or scleral icterus.     Results Review:   Pathology and operative findings reviewed with patient.    Tissue Pathology Exam (12/17/2024 12:52)   - Gross And Histologic Changes of Marked Chronic Cholecystitis.      Assessment / Plan:    Diagnoses and all orders for this visit:    1. S/P laparoscopic cholecystectomy (Primary)    2. Chronic cholecystitis        Kajal Huston is 2 weeks s/p laparoscopic cholecystectomy. She is overall doing well. At this time, she is able to return to normal activity as tolerated. She voiced understanding of this. She will call for an appointment if she has any new problems or concerns. She is agreeable to the plan.     Follow up:     Return if symptoms worsen or fail to improve.        Electronically signed by Becky Girard PA-C  01/02/25  10:48 CST                  "

## (undated) DEVICE — FR5 INFINITI MULTIPAC: Brand: INFINITI

## (undated) DEVICE — NDL HYPO PRECISIONGLIDE REG 21G 1 1/2

## (undated) DEVICE — BLADELESS OBTURATOR: Brand: WECK VISTA

## (undated) DEVICE — GLOVE,SURG,SENSICARE,ALOE,LF,PF,6: Brand: MEDLINE

## (undated) DEVICE — KT CLN CLEANOR SCPE

## (undated) DEVICE — DAVINCI: Brand: MEDLINE INDUSTRIES, INC.

## (undated) DEVICE — TISSUE RETRIEVAL SYSTEM: Brand: INZII RETRIEVAL SYSTEM

## (undated) DEVICE — SUP ARMBRD ART/LINE BLU

## (undated) DEVICE — SEAL

## (undated) DEVICE — IMMOB KN 3PNL DLX CANVS 19IN BLU

## (undated) DEVICE — 4-PORT MANIFOLD: Brand: NEPTUNE 2

## (undated) DEVICE — ADHS SKIN PREMIERPRO EXOFIN TOPICAL HI/VISC .5ML

## (undated) DEVICE — ELECTRD BLD EZ CLN MOD XLNG 2.75IN

## (undated) DEVICE — APPL CHLORAPREP HI/LITE 26ML ORNG

## (undated) DEVICE — CANN CO2/O2 NASL A/

## (undated) DEVICE — ARM DRAPE

## (undated) DEVICE — SYR LL TP 10ML STRL

## (undated) DEVICE — MYNXGRIP 6F/7F: Brand: MYNXGRIP

## (undated) DEVICE — SOLIDIFIER LIQUI LOC PLUS 2000CC

## (undated) DEVICE — STRAP SFTY OR/TABL STRTCHR 60X3IN WHT LF

## (undated) DEVICE — SUT MNCRYL 4/0 PS2 27IN UD MCP426H

## (undated) DEVICE — MODEL AT P65, P/N 701554-001KIT CONTENTS: HAND CONTROLLER, 3-WAY HIGH-PRESSURE STOPCOCK WITH ROTATING END AND PREMIUM HIGH-PRESSURE TUBING: Brand: ANGIOTOUCH® KIT

## (undated) DEVICE — GW STARTER FXD CORE J .035 3X150CM 3MM

## (undated) DEVICE — GW STARTER FXD CORE J .035 3X260CM 3MM

## (undated) DEVICE — RADIFOCUS OPTITORQUE ANGIOGRAPHIC CATHETER: Brand: OPTITORQUE

## (undated) DEVICE — GLV SURG SENSICARE W/ALOE PF LF 6.5 STRL

## (undated) DEVICE — TR BAND RADIAL ARTERY COMPRESSION DEVICE: Brand: TR BAND

## (undated) DEVICE — ELECTRD PAD DEFIB A/

## (undated) DEVICE — TBG INSUFFLATION LUER LOCK: Brand: MEDLINE INDUSTRIES, INC.

## (undated) DEVICE — PINNACLE INTRODUCER SHEATH: Brand: PINNACLE

## (undated) DEVICE — SKIN PREP TRAY W/CHG: Brand: MEDLINE INDUSTRIES, INC.

## (undated) DEVICE — SOL IRR NACL 0.9PCT BT 1000ML

## (undated) DEVICE — PATIENT RETURN ELECTRODE, SINGLE-USE, CONTACT QUALITY MONITORING, ADULT, WITH 9FT CORD, FOR PATIENTS WEIGING OVER 33LBS. (15KG): Brand: MEGADYNE

## (undated) DEVICE — MODEL BT2000 P/N 700287-012KIT CONTENTS: MANIFOLD WITH SALINE AND CONTRAST PORTS, SALINE TUBING WITH SPIKE AND HAND SYRINGE, TRANSDUCER: Brand: BT2000 AUTOMATED MANIFOLD KIT

## (undated) DEVICE — GLIDESHEATH SLENDER STAINLESS STEEL KIT: Brand: GLIDESHEATH SLENDER

## (undated) DEVICE — DRSNG PRESS SAFEGUARD

## (undated) DEVICE — PK CATH CARD 30 CA/4